# Patient Record
Sex: FEMALE | Race: WHITE | NOT HISPANIC OR LATINO | Employment: UNEMPLOYED | URBAN - METROPOLITAN AREA
[De-identification: names, ages, dates, MRNs, and addresses within clinical notes are randomized per-mention and may not be internally consistent; named-entity substitution may affect disease eponyms.]

---

## 2017-01-13 RX ORDER — IBUPROFEN 800 MG/1
TABLET ORAL
Qty: 90 TABLET | Refills: 0 | OUTPATIENT
Start: 2017-01-13

## 2017-01-19 RX ORDER — IBUPROFEN 800 MG/1
TABLET ORAL
Qty: 90 TABLET | Refills: 0 | Status: SHIPPED | OUTPATIENT
Start: 2017-01-19 | End: 2017-02-22 | Stop reason: SDUPTHER

## 2017-02-22 RX ORDER — IBUPROFEN 800 MG/1
TABLET ORAL
Qty: 90 TABLET | Refills: 0 | Status: SHIPPED | OUTPATIENT
Start: 2017-02-22 | End: 2017-10-03 | Stop reason: SDUPTHER

## 2017-03-20 RX ORDER — IBUPROFEN 800 MG/1
TABLET ORAL
Qty: 90 TABLET | Refills: 0 | Status: SHIPPED | OUTPATIENT
Start: 2017-03-20 | End: 2017-06-22 | Stop reason: SDUPTHER

## 2017-05-03 RX ORDER — LEVOTHYROXINE SODIUM 0.15 MG/1
TABLET ORAL
Qty: 30 TABLET | Refills: 2 | Status: SHIPPED | OUTPATIENT
Start: 2017-05-03 | End: 2017-08-29 | Stop reason: SDUPTHER

## 2017-06-22 DIAGNOSIS — I10 ESSENTIAL HYPERTENSION: ICD-10-CM

## 2017-06-22 RX ORDER — LISINOPRIL AND HYDROCHLOROTHIAZIDE 20; 12.5 MG/1; MG/1
TABLET ORAL
Qty: 90 TABLET | Refills: 0 | Status: SHIPPED | OUTPATIENT
Start: 2017-06-22 | End: 2017-08-29 | Stop reason: SDUPTHER

## 2017-06-22 RX ORDER — GABAPENTIN 400 MG/1
CAPSULE ORAL
Qty: 90 CAPSULE | Refills: 0 | Status: SHIPPED | OUTPATIENT
Start: 2017-06-22 | End: 2017-10-03 | Stop reason: SDUPTHER

## 2017-06-22 RX ORDER — IBUPROFEN 800 MG/1
TABLET ORAL
Qty: 90 TABLET | Refills: 0 | Status: SHIPPED | OUTPATIENT
Start: 2017-06-22 | End: 2017-09-28 | Stop reason: DRUGHIGH

## 2017-08-29 DIAGNOSIS — I10 ESSENTIAL HYPERTENSION: ICD-10-CM

## 2017-08-31 RX ORDER — LISINOPRIL AND HYDROCHLOROTHIAZIDE 20; 12.5 MG/1; MG/1
1 TABLET ORAL DAILY
Qty: 30 TABLET | Refills: 0 | Status: SHIPPED | OUTPATIENT
Start: 2017-08-31 | End: 2017-10-03 | Stop reason: SDUPTHER

## 2017-08-31 RX ORDER — LEVOTHYROXINE SODIUM 0.15 MG/1
150 TABLET ORAL DAILY
Qty: 30 TABLET | Refills: 2 | Status: SHIPPED | OUTPATIENT
Start: 2017-08-31 | End: 2017-09-29 | Stop reason: DRUGHIGH

## 2017-09-28 ENCOUNTER — OFFICE VISIT (OUTPATIENT)
Dept: INTERNAL MEDICINE | Facility: CLINIC | Age: 42
End: 2017-09-28

## 2017-09-28 VITALS
DIASTOLIC BLOOD PRESSURE: 80 MMHG | RESPIRATION RATE: 18 BRPM | WEIGHT: 263 LBS | HEART RATE: 59 BPM | HEIGHT: 63 IN | SYSTOLIC BLOOD PRESSURE: 138 MMHG | BODY MASS INDEX: 46.6 KG/M2 | OXYGEN SATURATION: 100 %

## 2017-09-28 DIAGNOSIS — E55.9 VITAMIN D DEFICIENCY: ICD-10-CM

## 2017-09-28 DIAGNOSIS — I10 ESSENTIAL HYPERTENSION: Primary | ICD-10-CM

## 2017-09-28 DIAGNOSIS — E03.9 HYPOTHYROIDISM, UNSPECIFIED TYPE: ICD-10-CM

## 2017-09-28 DIAGNOSIS — Z13.220 SCREENING FOR CHOLESTEROL LEVEL: ICD-10-CM

## 2017-09-28 LAB
25(OH)D3 SERPL-MCNC: 25.5 NG/ML
ALBUMIN SERPL-MCNC: 4.4 G/DL (ref 3.2–4.8)
ALBUMIN/GLOB SERPL: 2 G/DL (ref 1.5–2.5)
ALP SERPL-CCNC: 77 U/L (ref 25–100)
ALT SERPL W P-5'-P-CCNC: 19 U/L (ref 7–40)
ANION GAP SERPL CALCULATED.3IONS-SCNC: 4 MMOL/L (ref 3–11)
ARTICHOKE IGE QN: 97 MG/DL (ref 0–130)
AST SERPL-CCNC: 21 U/L (ref 0–33)
BILIRUB SERPL-MCNC: 0.4 MG/DL (ref 0.3–1.2)
BUN BLD-MCNC: 10 MG/DL (ref 9–23)
BUN/CREAT SERPL: 16.7 (ref 7–25)
CALCIUM SPEC-SCNC: 8.9 MG/DL (ref 8.7–10.4)
CHLORIDE SERPL-SCNC: 110 MMOL/L (ref 99–109)
CHOLEST SERPL-MCNC: 163 MG/DL (ref 0–200)
CO2 SERPL-SCNC: 24 MMOL/L (ref 20–31)
CREAT BLD-MCNC: 0.6 MG/DL (ref 0.6–1.3)
GFR SERPL CREATININE-BSD FRML MDRD: 111 ML/MIN/1.73
GLOBULIN UR ELPH-MCNC: 2.2 GM/DL
GLUCOSE BLD-MCNC: 76 MG/DL (ref 70–100)
HDLC SERPL-MCNC: 35 MG/DL (ref 40–60)
POTASSIUM BLD-SCNC: 4.2 MMOL/L (ref 3.5–5.5)
PROT SERPL-MCNC: 6.6 G/DL (ref 5.7–8.2)
SODIUM BLD-SCNC: 138 MMOL/L (ref 132–146)
TRIGL SERPL-MCNC: 294 MG/DL (ref 0–150)
TSH SERPL DL<=0.05 MIU/L-ACNC: 14.9 MIU/ML (ref 0.35–5.35)

## 2017-09-28 PROCEDURE — 80053 COMPREHEN METABOLIC PANEL: CPT | Performed by: NURSE PRACTITIONER

## 2017-09-28 PROCEDURE — 90471 IMMUNIZATION ADMIN: CPT | Performed by: NURSE PRACTITIONER

## 2017-09-28 PROCEDURE — 80061 LIPID PANEL: CPT | Performed by: NURSE PRACTITIONER

## 2017-09-28 PROCEDURE — 84443 ASSAY THYROID STIM HORMONE: CPT | Performed by: NURSE PRACTITIONER

## 2017-09-28 PROCEDURE — 82306 VITAMIN D 25 HYDROXY: CPT | Performed by: NURSE PRACTITIONER

## 2017-09-28 PROCEDURE — 90686 IIV4 VACC NO PRSV 0.5 ML IM: CPT | Performed by: NURSE PRACTITIONER

## 2017-09-28 PROCEDURE — 99214 OFFICE O/P EST MOD 30 MIN: CPT | Performed by: NURSE PRACTITIONER

## 2017-09-28 RX ORDER — MELATONIN
1000 DAILY
COMMUNITY

## 2017-09-28 NOTE — PROGRESS NOTES
"Subjective  Hypertension (follow up)      Tawana Christianson is a 41 y.o. female.   Allergies   Allergen Reactions   • Diclofenac    • Voltaren [Diclofenac Sodium]      History of Present Illness      Doing well, chronic conditions are stable , no complaints, taking meds as ordered   The following portions of the patient's history were reviewed and updated as appropriate: current medications, past family history, past surgical history and problem list.    Review of Systems   Constitutional: Negative for fatigue.   Respiratory: Negative for apnea, chest tightness and shortness of breath.    Cardiovascular: Negative for chest pain, palpitations and leg swelling.   Skin: Negative for color change.   Psychiatric/Behavioral: The patient is not nervous/anxious.    All other systems reviewed and are negative.      Objective   Physical Exam   Constitutional: She is oriented to person, place, and time. She appears well-developed and well-nourished.   HENT:   Head: Normocephalic.   Eyes: Conjunctivae are normal.   Neck: Neck supple. No thyromegaly present.   Cardiovascular: Normal rate, regular rhythm and normal heart sounds.    Pulmonary/Chest: Effort normal and breath sounds normal.   Lymphadenopathy:     She has no cervical adenopathy.   Neurological: She is alert and oriented to person, place, and time.   Skin: Skin is warm and dry.   Psychiatric: She has a normal mood and affect. Her behavior is normal. Judgment and thought content normal.   Nursing note and vitals reviewed.    /80  Pulse 59  Resp 18  Ht 63\" (160 cm)  Wt 263 lb (119 kg)  SpO2 100%  BMI 46.59 kg/m2    Assessment/Plan     Problem List Items Addressed This Visit        Cardiovascular and Mediastinum    Essential hypertension - Primary    Relevant Orders    Comprehensive Metabolic Panel       Digestive    Vitamin D deficiency    Relevant Orders    Vitamin D 25 Hydroxy       Endocrine    Hypothyroidism    Relevant Orders    TSH      Other Visit " Diagnoses     Screening for cholesterol level        Relevant Orders    Lipid Panel        Flu shot today, rtc 1 yr cpe

## 2017-09-29 RX ORDER — LEVOTHYROXINE SODIUM 0.2 MG/1
200 TABLET ORAL DAILY
Qty: 30 TABLET | Refills: 4 | Status: SHIPPED | OUTPATIENT
Start: 2017-09-29 | End: 2017-10-03 | Stop reason: SDUPTHER

## 2017-10-03 DIAGNOSIS — I10 ESSENTIAL HYPERTENSION: ICD-10-CM

## 2017-10-03 RX ORDER — LEVOTHYROXINE SODIUM 0.2 MG/1
200 TABLET ORAL DAILY
Qty: 30 TABLET | Refills: 4 | Status: SHIPPED | OUTPATIENT
Start: 2017-10-03 | End: 2021-03-02

## 2017-10-03 RX ORDER — IBUPROFEN 800 MG/1
800 TABLET ORAL EVERY 8 HOURS PRN
Qty: 90 TABLET | Refills: 0 | Status: SHIPPED | OUTPATIENT
Start: 2017-10-03 | End: 2018-02-02 | Stop reason: SDUPTHER

## 2017-10-03 RX ORDER — LISINOPRIL AND HYDROCHLOROTHIAZIDE 20; 12.5 MG/1; MG/1
1 TABLET ORAL DAILY
Qty: 30 TABLET | Refills: 0 | Status: SHIPPED | OUTPATIENT
Start: 2017-10-03 | End: 2018-02-26 | Stop reason: SDUPTHER

## 2017-10-03 RX ORDER — GABAPENTIN 400 MG/1
400 CAPSULE ORAL 3 TIMES DAILY
Qty: 90 CAPSULE | Refills: 5 | Status: SHIPPED | OUTPATIENT
Start: 2017-10-03 | End: 2018-06-04 | Stop reason: SDUPTHER

## 2017-10-04 ENCOUNTER — TELEPHONE (OUTPATIENT)
Dept: INTERNAL MEDICINE | Facility: CLINIC | Age: 42
End: 2017-10-04

## 2017-10-04 NOTE — TELEPHONE ENCOUNTER
Spoke with pt and explained that her Rx for gabapentin is ready for  at the front window.   Pt was unaware gabapentin is now a controlled subtance and asked if it could not be submitted electronically anymore. I explained that now she'd have to  due to it being switched to a controlled substance. Pt stated she understood and was thankful for the call.

## 2017-12-01 DIAGNOSIS — I10 ESSENTIAL HYPERTENSION: ICD-10-CM

## 2017-12-01 RX ORDER — LISINOPRIL AND HYDROCHLOROTHIAZIDE 20; 12.5 MG/1; MG/1
TABLET ORAL
Qty: 30 TABLET | Refills: 2 | Status: SHIPPED | OUTPATIENT
Start: 2017-12-01 | End: 2018-02-26 | Stop reason: SDUPTHER

## 2018-02-03 RX ORDER — IBUPROFEN 800 MG/1
TABLET ORAL
Qty: 90 TABLET | Refills: 0 | Status: SHIPPED | OUTPATIENT
Start: 2018-02-03 | End: 2018-03-23 | Stop reason: SDUPTHER

## 2018-02-26 ENCOUNTER — OFFICE VISIT (OUTPATIENT)
Dept: INTERNAL MEDICINE | Facility: CLINIC | Age: 43
End: 2018-02-26

## 2018-02-26 VITALS
HEIGHT: 63 IN | OXYGEN SATURATION: 99 % | DIASTOLIC BLOOD PRESSURE: 82 MMHG | WEIGHT: 255.2 LBS | BODY MASS INDEX: 45.22 KG/M2 | HEART RATE: 93 BPM | SYSTOLIC BLOOD PRESSURE: 130 MMHG

## 2018-02-26 DIAGNOSIS — I10 ESSENTIAL HYPERTENSION: Primary | ICD-10-CM

## 2018-02-26 DIAGNOSIS — M79.18 MUSCULOSKELETAL PAIN: ICD-10-CM

## 2018-02-26 DIAGNOSIS — E03.9 HYPOTHYROIDISM, UNSPECIFIED TYPE: ICD-10-CM

## 2018-02-26 LAB — TSH SERPL DL<=0.05 MIU/L-ACNC: 0.49 MIU/ML (ref 0.35–5.35)

## 2018-02-26 PROCEDURE — 99214 OFFICE O/P EST MOD 30 MIN: CPT | Performed by: NURSE PRACTITIONER

## 2018-02-26 PROCEDURE — 84443 ASSAY THYROID STIM HORMONE: CPT | Performed by: NURSE PRACTITIONER

## 2018-02-26 RX ORDER — LISINOPRIL AND HYDROCHLOROTHIAZIDE 20; 12.5 MG/1; MG/1
1 TABLET ORAL DAILY
Qty: 30 TABLET | Refills: 3 | Status: SHIPPED | OUTPATIENT
Start: 2018-02-26

## 2018-02-26 RX ORDER — CYCLOBENZAPRINE HCL 10 MG
10 TABLET ORAL 3 TIMES DAILY PRN
Qty: 30 TABLET | Refills: 0 | OUTPATIENT
Start: 2018-02-26 | End: 2019-12-15 | Stop reason: HOSPADM

## 2018-02-26 RX ORDER — PREDNISONE 20 MG/1
TABLET ORAL
Qty: 19 TABLET | Refills: 0 | Status: SHIPPED | OUTPATIENT
Start: 2018-02-26 | End: 2019-02-10

## 2018-02-26 NOTE — PROGRESS NOTES
"Subjective  Back Pain (mid back. on set 10 days ago.  no injuries. )      Tawana Christianson is a 42 y.o. female.   Allergies   Allergen Reactions   • Diclofenac    • Voltaren [Diclofenac Sodium]      History of Present Illness      Mid back pain x 10 days, woke up with it , starts in center and shoots out , constant ache, does get episodes of sharp stabbing but an ache all the time , nothing makes worse or better, has tried ibuprofen 800mg no relief   No urinary sx  Taking b/p meds as ordered, no cp, no soa, no vision changes   The following portions of the patient's history were reviewed and updated as appropriate: allergies, past family history, past surgical history and problem list.    Review of Systems   Musculoskeletal: Positive for back pain.   All other systems reviewed and are negative.      Objective   Physical Exam   Constitutional: She appears well-developed and well-nourished.   HENT:   Head: Normocephalic and atraumatic.   Eyes: Conjunctivae are normal.   Neck: Neck supple.   Cardiovascular: Normal rate and regular rhythm.    Pulmonary/Chest: Effort normal and breath sounds normal.   Lymphadenopathy:     She has no cervical adenopathy.   Skin: Skin is warm and dry.   Psychiatric: She has a normal mood and affect. Her behavior is normal. Judgment and thought content normal.   Nursing note and vitals reviewed.    /82  Pulse 93  Ht 160 cm (62.99\")  Wt 116 kg (255 lb 3.2 oz)  SpO2 99%  BMI 45.22 kg/m2    Assessment/Plan     Problem List Items Addressed This Visit        Cardiovascular and Mediastinum    Essential hypertension - Primary    Relevant Medications    lisinopril-hydrochlorothiazide (PRINZIDE,ZESTORETIC) 20-12.5 MG per tablet       Endocrine    Hypothyroidism    Relevant Medications    predniSONE (DELTASONE) 20 MG tablet    Other Relevant Orders    TSH      Other Visit Diagnoses     Musculoskeletal pain        Relevant Medications    predniSONE (DELTASONE) 20 MG tablet    " cyclobenzaprine (FLEXERIL) 10 MG tablet        I will review labs and call with resulrs and poc, if no relief will rtc

## 2018-03-23 RX ORDER — IBUPROFEN 800 MG/1
TABLET ORAL
Qty: 90 TABLET | Refills: 0 | Status: SHIPPED | OUTPATIENT
Start: 2018-03-23 | End: 2018-06-05 | Stop reason: SDUPTHER

## 2018-06-02 NOTE — TELEPHONE ENCOUNTER
Please advise on Gabapentin,  Last OV: 2/26/2018  Last fill: 10/03/2017 Q: 90 TID w/ 5 refills  Jeremias: Needs updated

## 2018-06-04 RX ORDER — GABAPENTIN 400 MG/1
CAPSULE ORAL
Qty: 90 CAPSULE | Refills: 5 | Status: CANCELLED | OUTPATIENT
Start: 2018-06-04

## 2018-06-04 RX ORDER — GABAPENTIN 400 MG/1
400 CAPSULE ORAL 3 TIMES DAILY
Qty: 90 CAPSULE | Refills: 0 | Status: SHIPPED | OUTPATIENT
Start: 2018-06-04 | End: 2019-12-17 | Stop reason: SDUPTHER

## 2018-06-05 RX ORDER — IBUPROFEN 800 MG/1
TABLET ORAL
Qty: 90 TABLET | Refills: 0 | Status: SHIPPED | OUTPATIENT
Start: 2018-06-05

## 2018-08-23 RX ORDER — IBUPROFEN 800 MG/1
TABLET ORAL
Qty: 90 TABLET | Refills: 0 | OUTPATIENT
Start: 2018-08-23

## 2019-02-10 ENCOUNTER — OFFICE VISIT (OUTPATIENT)
Dept: RETAIL CLINIC | Facility: CLINIC | Age: 44
End: 2019-02-10

## 2019-02-10 VITALS
TEMPERATURE: 98.2 F | BODY MASS INDEX: 48.76 KG/M2 | RESPIRATION RATE: 18 BRPM | SYSTOLIC BLOOD PRESSURE: 128 MMHG | WEIGHT: 265 LBS | DIASTOLIC BLOOD PRESSURE: 84 MMHG | HEIGHT: 62 IN | HEART RATE: 70 BPM | OXYGEN SATURATION: 98 %

## 2019-02-10 DIAGNOSIS — H10.31 ACUTE CONJUNCTIVITIS OF RIGHT EYE, UNSPECIFIED ACUTE CONJUNCTIVITIS TYPE: Primary | ICD-10-CM

## 2019-02-10 PROCEDURE — 99213 OFFICE O/P EST LOW 20 MIN: CPT | Performed by: NURSE PRACTITIONER

## 2019-02-10 RX ORDER — TOBRAMYCIN 3 MG/ML
1 SOLUTION/ DROPS OPHTHALMIC EVERY 6 HOURS SCHEDULED
Qty: 5 ML | Refills: 0 | OUTPATIENT
Start: 2019-02-10 | End: 2019-12-15 | Stop reason: HOSPADM

## 2019-02-10 NOTE — PATIENT INSTRUCTIONS
Allergic Conjunctivitis, Adult  Allergic conjunctivitis is inflammation of the clear membrane that covers the white part of your eye and the inner surface of your eyelid (conjunctiva). The inflammation is caused by allergies. The blood vessels in the conjunctiva become inflamed and this causes the eyes to become red or pink. The eyes often feel itchy. Allergic conjunctivitis cannot be spread from one person to another person (is not contagious).  What are the causes?  This condition is caused by an allergic reaction. Common causes of an allergic reaction (allergens) include:  · Outdoor allergens, such as:  ? Pollen.  ? Grass and weeds.  ? Mold spores.  · Indoor allergens, such as:  ? Dust.  ? Smoke.  ? Mold.  ? Pet dander.  ? Animal hair.    What increases the risk?  You may be more likely to develop this condition if you have a family history of allergies, such as:  · Allergic rhinitis.  · Bronchial asthma.  · Atopic dermatitis.    What are the signs or symptoms?  Symptoms of this condition include eyes that are:  · Itchy.  · Red.  · Watery.  · Puffy.    Your eyes may also:  · Sting or burn.  · Have clear drainage coming from them.    How is this diagnosed?  This condition may be diagnosed by medical history and physical exam. If you have drainage from your eyes, it may be tested to rule out other causes of conjunctivitis. You may also need to see a health care provider who specializes in treating allergies (allergist) or eye conditions (ophthalmologist) for tests to confirm the diagnosis. You may have:  · Skin tests to see which allergens are causing your symptoms. These tests involve pricking the skin with a tiny needle and exposing the skin to small amounts of potential allergens to see if your skin reacts.  · Blood tests.  · Tissue scrapings from your eyelid. These will be examined under a microscope.    How is this treated?  Treatments for this condition may include:  · Cold cloths (compresses) to soothe  itching and swelling.  · Washing the face to remove allergens.  · Eye drops. These may be prescription or over-the-counter. There are several different types. You may need to try different types to see which one works best for you. Your may need:  ? Eye drops that block the allergic reaction (antihistamine).  ? Eye drops that reduce swelling and irritation (anti-inflammatory).  ? Steroid eye drops to lessen a severe reaction (vernal conjunctivitis).  · Oral antihistamine medicines to reduce your allergic reaction. You may need these if eye drops do not help or are difficult to use.    Follow these instructions at home:  · Avoid known allergens whenever possible.  · Take or apply over-the-counter and prescription medicines only as told by your health care provider. These include any eye drops.  · Apply a cool, clean washcloth to your eye for 10-20 minutes, 3-4 times a day.  · Do not touch or rub your eyes.  · Do not wear contact lenses until the inflammation is gone. Wear glasses instead.  · Do not wear eye makeup until the inflammation is gone.  · Keep all follow-up visits as told by your health care provider. This is important.  Contact a health care provider if:  · Your symptoms get worse or do not improve with treatment.  · You have mild eye pain.  · You have sensitivity to light.  · You have spots or blisters on your eyes.  · You have pus draining from your eye.  · You have a fever.  Get help right away if:  · You have redness, swelling, or other symptoms in only one eye.  · Your vision is blurred or you have vision changes.  · You have severe eye pain.  This information is not intended to replace advice given to you by your health care provider. Make sure you discuss any questions you have with your health care provider.  Document Released: 03/09/2004 Document Revised: 08/16/2017 Document Reviewed: 06/30/2017  [employer] Interactive Patient Education © 2018 [employer]    Follow up with eye care specialist. See  ASAP if excessive tearing, sensitivity to light, eye pain, decreased vision, headache or other symptoms that prompt urgent evaluation.   Avoid touching or rubbing eye.  Damp compress to eye may be soothing

## 2019-02-10 NOTE — PROGRESS NOTES
Subjective   Conjunctivitis    Tawana Christianson is a 43 y.o. female. Patient with onset of itching sensation right eye yesterday. She noticed that she was rubbing at it often and it became red and had some swelling. She used OTC lubricating drops and local ice application with minimal improvement. She denies exposure to pink eye.     Conjunctivitis    The current episode started yesterday. The onset is undetermined. The problem occurs continuously. The problem has been unchanged. The problem is moderate. Nothing relieves the symptoms. Nothing aggravates the symptoms. Associated symptoms include eye itching and eye redness. Pertinent negatives include no fever, no decreased vision, no double vision, no photophobia, no nausea, no vomiting, no congestion, no ear pain, no headaches, no mouth sores, no rhinorrhea, no sore throat, no neck pain, no cough, no URI, no wheezing, no rash, no eye discharge and no eye pain. The right eye is affected. The eyelid exhibits swelling.        History Obtained from: Patient    Past Medical History:   Diagnosis Date   • Chills    • Essential hypertension 2016   • Foot pain    • History of abnormal cervical Pap smear     normal not abnormal -    • History of Papanicolaou smear of cervix    • Hypertension    • Numbness    • Pain    • Paresthesia    • Plantar fasciitis    • Pulmonary nodule    • Right ankle pain    • Tingling    • Vertigo    • Viral infection    • Viral infection    • Vitamin D deficiency      Past Surgical History:   Procedure Laterality Date   •  SECTION      x3   • TONSILLECTOMY       Social History     Tobacco Use   • Smoking status: Former Smoker     Packs/day: 2.00     Years: 20.00     Pack years: 40.00     Last attempt to quit: 2010     Years since quittin.1   • Smokeless tobacco: Never Used   • Tobacco comment: 1.5-2 ppd for 20 yrs   Substance Use Topics   • Alcohol use: No   • Drug use: No     Family History   Problem Relation Age of Onset    • Hypertension Mother    • Arthritis Mother    • Heart disease Father    • Diabetes Father    • Hypertension Father    • Stroke Father    • Heart attack Father    • Arthritis Father    • Obesity Father    • Alzheimer's disease Maternal Grandmother    • Cancer Maternal Grandmother    • Cancer Maternal Grandfather    • Alzheimer's disease Maternal Grandfather      Allergies   Allergen Reactions   • Diclofenac Swelling   • Voltaren [Diclofenac Sodium] Swelling     Current Outpatient Medications   Medication Sig Dispense Refill   • cetirizine (ZYRTEC ALLERGY) 10 MG tablet Take  by mouth.     • cholecalciferol (VITAMIN D3) 1000 units tablet Take 1,000 Units by mouth Daily.     • cyclobenzaprine (FLEXERIL) 10 MG tablet Take 1 tablet by mouth 3 (Three) Times a Day As Needed for Muscle Spasms. 30 tablet 0   • gabapentin (NEURONTIN) 400 MG capsule Take 1 capsule by mouth 3 (Three) Times a Day. Needs appt before further refills 90 capsule 0   • ibuprofen (ADVIL,MOTRIN) 800 MG tablet TAKE 1 TABLET BY MOUTH EVERY 8 HOURS AS NEEDED FOR MILD PAIN 90 tablet 0   • levothyroxine (SYNTHROID) 200 MCG tablet Take 1 tablet by mouth Daily. 30 tablet 4   • lisinopril-hydrochlorothiazide (PRINZIDE,ZESTORETIC) 20-12.5 MG per tablet Take 1 tablet by mouth Daily. 30 tablet 3   • tobramycin (TOBREX) 0.3 % solution ophthalmic solution Administer 1 drop to the right eye Every 6 (Six) Hours. 5 mL 0     No current facility-administered medications for this visit.         The following portions of the patient's history were reviewed and updated as appropriate: allergies, current medications, past family history, past medical history, past social history and past surgical history.    Review of Systems   Constitutional: Negative for chills and fever.   HENT: Negative for congestion, ear pain, mouth sores, rhinorrhea and sore throat.    Eyes: Positive for redness and itching. Negative for double vision, photophobia, pain, discharge and visual  "disturbance.   Respiratory: Negative for cough and wheezing.    Gastrointestinal: Negative for nausea and vomiting.   Musculoskeletal: Negative for myalgias, neck pain and neck stiffness.   Skin: Negative for rash and wound.   Allergic/Immunologic: Negative for immunocompromised state.   Neurological: Negative for dizziness and headaches.   Hematological: Negative for adenopathy.   Psychiatric/Behavioral: Negative.        Objective     VITAL SIGNS:   Vitals:    02/10/19 1132   BP: 128/84   BP Location: Left arm   Pulse: 70   Resp: 18   Temp: 98.2 °F (36.8 °C)   SpO2: 98%   Weight: 120 kg (265 lb)   Height: 157.5 cm (62\")   Body mass index is 48.47 kg/m².    Physical Exam   Constitutional:  Non-toxic appearance. No distress.   HENT:   Head: Atraumatic.   Right Ear: External ear normal.   Left Ear: External ear normal.   Nose: Nose normal.   Mouth/Throat: Uvula is midline and mucous membranes are normal.   Eyes: Right eye exhibits no discharge, no exudate and no hordeolum. No foreign body present in the right eye. Right conjunctiva is injected. Right conjunctiva has no hemorrhage. No scleral icterus. Right eye exhibits normal extraocular motion and no nystagmus. Right pupil is round and reactive. Pupils are equal.   Neck: Normal range of motion and phonation normal. Neck supple. No tracheal deviation present.   Cardiovascular: Normal rate and regular rhythm.   Pulmonary/Chest: Effort normal.   Neurological: She is alert.   Skin: Skin is warm and dry.   Psychiatric: She is attentive.           Assessment/Plan     Tawana was seen today for conjunctivitis.    Diagnoses and all orders for this visit:    Acute conjunctivitis of right eye, unspecified acute conjunctivitis type    Other orders  -     tobramycin (TOBREX) 0.3 % solution ophthalmic solution; Administer 1 drop to the right eye Every 6 (Six) Hours.        PLAN: Will trial antibiotic drops and re-eval for response. Patient to communicate effectiveness of " prescribed treatment and follow up with eye care specialist. See ASAP if symptoms worsen or for the development of new symptoms that need attention.    The patient voiced understanding and agreement to the patient treatment plan and instructions     LEVAR Castillo

## 2019-05-21 ENCOUNTER — LAB (OUTPATIENT)
Dept: LAB | Facility: HOSPITAL | Age: 44
End: 2019-05-21

## 2019-05-21 ENCOUNTER — OFFICE VISIT (OUTPATIENT)
Dept: NEUROLOGY | Facility: CLINIC | Age: 44
End: 2019-05-21

## 2019-05-21 VITALS
BODY MASS INDEX: 48.76 KG/M2 | HEIGHT: 62 IN | WEIGHT: 265 LBS | DIASTOLIC BLOOD PRESSURE: 76 MMHG | SYSTOLIC BLOOD PRESSURE: 132 MMHG

## 2019-05-21 DIAGNOSIS — G62.9 NEUROPATHY: ICD-10-CM

## 2019-05-21 DIAGNOSIS — M54.41 CHRONIC BILATERAL LOW BACK PAIN WITH BILATERAL SCIATICA: ICD-10-CM

## 2019-05-21 DIAGNOSIS — G89.29 CHRONIC BILATERAL LOW BACK PAIN WITH BILATERAL SCIATICA: ICD-10-CM

## 2019-05-21 DIAGNOSIS — G62.9 NEUROPATHY: Primary | ICD-10-CM

## 2019-05-21 DIAGNOSIS — M54.42 CHRONIC BILATERAL LOW BACK PAIN WITH BILATERAL SCIATICA: ICD-10-CM

## 2019-05-21 LAB
CRP SERPL-MCNC: 1.08 MG/DL (ref 0–0.5)
ERYTHROCYTE [SEDIMENTATION RATE] IN BLOOD: 17 MM/HR (ref 0–20)
FOLATE SERPL-MCNC: >20 NG/ML (ref 4.78–24.2)
VIT B12 BLD-MCNC: 460 PG/ML (ref 211–946)

## 2019-05-21 PROCEDURE — 82784 ASSAY IGA/IGD/IGG/IGM EACH: CPT

## 2019-05-21 PROCEDURE — 84155 ASSAY OF PROTEIN SERUM: CPT

## 2019-05-21 PROCEDURE — 99204 OFFICE O/P NEW MOD 45 MIN: CPT | Performed by: PSYCHIATRY & NEUROLOGY

## 2019-05-21 PROCEDURE — 83516 IMMUNOASSAY NONANTIBODY: CPT

## 2019-05-21 PROCEDURE — 86255 FLUORESCENT ANTIBODY SCREEN: CPT

## 2019-05-21 PROCEDURE — 86225 DNA ANTIBODY NATIVE: CPT

## 2019-05-21 PROCEDURE — 36415 COLL VENOUS BLD VENIPUNCTURE: CPT

## 2019-05-21 PROCEDURE — 86235 NUCLEAR ANTIGEN ANTIBODY: CPT

## 2019-05-21 PROCEDURE — 86140 C-REACTIVE PROTEIN: CPT

## 2019-05-21 PROCEDURE — 82607 VITAMIN B-12: CPT

## 2019-05-21 PROCEDURE — 85652 RBC SED RATE AUTOMATED: CPT

## 2019-05-21 PROCEDURE — 84165 PROTEIN E-PHORESIS SERUM: CPT

## 2019-05-21 PROCEDURE — 86038 ANTINUCLEAR ANTIBODIES: CPT

## 2019-05-21 PROCEDURE — 82595 ASSAY OF CRYOGLOBULIN: CPT

## 2019-05-21 PROCEDURE — 82746 ASSAY OF FOLIC ACID SERUM: CPT

## 2019-05-21 PROCEDURE — 86592 SYPHILIS TEST NON-TREP QUAL: CPT

## 2019-05-21 PROCEDURE — 86618 LYME DISEASE ANTIBODY: CPT

## 2019-05-21 RX ORDER — OMEPRAZOLE 20 MG/1
20 CAPSULE, DELAYED RELEASE ORAL DAILY
COMMUNITY

## 2019-05-21 RX ORDER — GABAPENTIN 100 MG/1
200 CAPSULE ORAL 3 TIMES DAILY
Qty: 180 CAPSULE | Refills: 3 | Status: SHIPPED | OUTPATIENT
Start: 2019-05-21 | End: 2019-06-20

## 2019-05-21 RX ORDER — OLMESARTAN MEDOXOMIL AND HYDROCHLOROTHIAZIDE 20/12.5 20; 12.5 MG/1; MG/1
TABLET ORAL
COMMUNITY
Start: 2019-05-16 | End: 2019-12-15 | Stop reason: HOSPADM

## 2019-05-21 NOTE — PROGRESS NOTES
Subjective:    CC: Tawana Christianson is seen today in consultation at the request of July Lott MD for idiopathic peripheral neuropathy       HPI:  Patient is a 43-year-old female referred to the clinic for evaluation of possible neuropathy and chronic low back pain.  She reports that she started having symptoms of neuropathy back in 2012 and since then, progressively it has become worse.  She reports tingling, numbness, burning and pain involving both her feet.  She also reports numbness involving digits 3, 4 and 5 in both her hands.  She reports that it has affected her balance and also gait.  In addition to this, she also reports several years history of chronic low back pain with sometimes radiates to involve back of her thighs extending up to the knees bilaterally.  She has had x-ray done a few years ago which showed mild spondylitic changes.  MRI has never been performed.  She has been on gabapentin for a while and currently takes 400 mg 3 times a day which helps with neuropathy symptoms somewhat.  He does not help with low back pain.  She is currently involved in physical therapy and it reports it helps temporarily with chronic low back pain.  She denies any problems with bowel or bladder control.  She does report sometimes chronic low back pain can radiate to involve hips bilaterally-worse on the right than on the left.    The following portions of the patient's history were reviewed today and updated as of 05/21/2019  : allergies, social history and problem list.  This document will be scanned to patient's chart.      Current Outpatient Medications:   •  cetirizine (ZYRTEC ALLERGY) 10 MG tablet, Take  by mouth., Disp: , Rfl:   •  cholecalciferol (VITAMIN D3) 1000 units tablet, Take 1,000 Units by mouth Daily., Disp: , Rfl:   •  cyclobenzaprine (FLEXERIL) 10 MG tablet, Take 1 tablet by mouth 3 (Three) Times a Day As Needed for Muscle Spasms., Disp: 30 tablet, Rfl: 0  •  gabapentin (NEURONTIN) 400 MG  capsule, Take 1 capsule by mouth 3 (Three) Times a Day. Needs appt before further refills, Disp: 90 capsule, Rfl: 0  •  ibuprofen (ADVIL,MOTRIN) 800 MG tablet, TAKE 1 TABLET BY MOUTH EVERY 8 HOURS AS NEEDED FOR MILD PAIN, Disp: 90 tablet, Rfl: 0  •  levothyroxine (SYNTHROID) 200 MCG tablet, Take 1 tablet by mouth Daily., Disp: 30 tablet, Rfl: 4  •  lisinopril-hydrochlorothiazide (PRINZIDE,ZESTORETIC) 20-12.5 MG per tablet, Take 1 tablet by mouth Daily., Disp: 30 tablet, Rfl: 3  •  olmesartan-hydrochlorothiazide (BENICAR HCT) 20-12.5 MG per tablet, , Disp: , Rfl:   •  omeprazole (priLOSEC) 20 MG capsule, Take 20 mg by mouth Daily., Disp: , Rfl:   •  tobramycin (TOBREX) 0.3 % solution ophthalmic solution, Administer 1 drop to the right eye Every 6 (Six) Hours., Disp: 5 mL, Rfl: 0  •  gabapentin (NEURONTIN) 100 MG capsule, Take 2 capsules by mouth 3 (Three) Times a Day for 30 days., Disp: 180 capsule, Rfl: 3   Past Medical History:   Diagnosis Date   • Chills    • Essential hypertension 2016   • Foot pain    • History of abnormal cervical Pap smear     normal not abnormal -    • History of Papanicolaou smear of cervix    • Hypertension    • Numbness    • Pain    • Paresthesia    • Plantar fasciitis    • Pulmonary nodule    • Right ankle pain    • Tingling    • Vertigo    • Viral infection    • Viral infection    • Vitamin D deficiency       Past Surgical History:   Procedure Laterality Date   •  SECTION      x3   • TONSILLECTOMY        Family History   Problem Relation Age of Onset   • Hypertension Mother    • Arthritis Mother    • Heart disease Father    • Diabetes Father    • Hypertension Father    • Stroke Father    • Heart attack Father    • Arthritis Father    • Obesity Father    • Alzheimer's disease Maternal Grandmother    • Cancer Maternal Grandmother    • Cancer Maternal Grandfather    • Alzheimer's disease Maternal Grandfather       Review of Systems   Constitutional: Positive for fatigue.  "  Cardiovascular: Positive for leg swelling.   Endocrine: Positive for cold intolerance.   Musculoskeletal: Positive for back pain and gait problem.   Neurological: Positive for tremors, weakness and numbness.   Psychiatric/Behavioral: The patient is nervous/anxious.        All other systems reviewed and are negative     Objective:    /76   Ht 157.5 cm (62\")   Wt 120 kg (265 lb)   BMI 48.47 kg/m²     Neurology Exam:    General apperance: NAD.     Mental status: Alert, awake and oriented to time place and person.    Recent and Remote memory: Can recall 3/3 objects at 5 minutes. Can recall historical events.     Attention span and Concentration: Serial 7s: Normal.     Fund of knowledge:  Normal.     Language and Speech: No aphasia or dysarthria.    Naming , Repitition and Comprehension:  Can name objects, repeat a sentence and follow commands. Speech is clear and fluent with good repetition, comprehension, and naming.    Cranial Nerves:   CN II: Visual fields are full. Intact. Fundi - Normal, No papillederma, Pupils - MAYRA  CN III, IV and VI: Extraocular movements are intact. Normal saccades.   CN V: Facial sensation is intact.   CN VII: Muscles of facial expression reveal no asymmetry. Intact.   CN VIII: Hearing is intact. Whispered voice intact.   CN IX and X: Palate elevates symmetrically. Intact  CN XI: Shoulder shrug is intact.   CN XII: Tongue is midline without evidence of atrophy or fasciculation.     Motor:  Right UE muscle strength 5/5. Normal tone.     Left UE muscle strength 5/5. Normal tone.      Right LE muscle strength5/5. Normal tone.     Left LE muscle strength 5/5. Normal tone.      Sensory: Reduced light touch, vibration and pinprick sensation bilateral hands and bilateral feet.    DTRs: 1+ bilaterally in upper extremity, 1+ bilateral knee and absent bilateral ankles.    Babinski: Negative bilaterally.    Co-ordination: Normal finger-to-nose, heel to shin B/L.    Rhomberg: " Negative.    Gait: Antalgic gait on the right.    Cardiovascular: Regular rate and rhythm without murmur, gallop or rub.    Ophthalmoscopic exam: Normal fundi, no papilledema.    Assessment and Plan:  1. Neuropathy  Likely neuropathy based on patient's symptoms.  Will order a detailed neuropathy work-up for further evaluation.  She was found to have low vitamin B12 in the past and was taking vitamin B12 thousand micrograms every day.  She reports that few months ago, when vitamin B12 was checked, it was more than 1000.  Following this, she stopped taking it.  I have advised her to restart vitamin B12.  Since symptoms are somewhat worse, I will also increase gabapentin to 600 mg 3 times a day.  - [place]; Future  - Anti-DNA Antibody, Double-stranded; Future  - Celiac Disease Panel; Future  - C-reactive Protein; Future  - Cryoglobulin; Future  - Lyme Disease IgG/IgM Antibodies; Future  - Protein Elec + Interp, Serum; Future  - RPR; Future  - Sedimentation Rate; Future  - Sjogren's Antibody, Anti-SS-A / -SS-B; Future  - Vitamin B12 & Folate; Future    2. Chronic bilateral low back pain with bilateral sciatica  Will  obtain MRI of lower back for further evaluation and based on the report, further treatment options will be discussed with the patient. Likely higher dose of gabapentin 600 mg 3 times a day will help with low back pain.  - MRI Lumbar Spine Without Contrast; Future       Return in about 8 weeks (around 7/16/2019).     Reinaldo Zelaya MD

## 2019-05-22 LAB
ALBUMIN SERPL-MCNC: 3.3 G/DL (ref 2.9–4.4)
ALBUMIN/GLOB SERPL: 1.1 {RATIO} (ref 0.7–1.7)
ALPHA1 GLOB FLD ELPH-MCNC: 0.2 G/DL (ref 0–0.4)
ALPHA2 GLOB SERPL ELPH-MCNC: 0.8 G/DL (ref 0.4–1)
ANA SER QL: NEGATIVE
B BURGDOR IGG SER QL: NEGATIVE
B BURGDOR IGM SER QL: NEGATIVE
B-GLOBULIN SERPL ELPH-MCNC: 1.3 G/DL (ref 0.7–1.3)
DSDNA AB SER-ACNC: <1 IU/ML (ref 0–9)
ENA SS-A AB SER-ACNC: <0.2 AI (ref 0–0.9)
ENA SS-B AB SER-ACNC: <0.2 AI (ref 0–0.9)
ENDOMYSIUM IGA SER QL: NEGATIVE
GAMMA GLOB SERPL ELPH-MCNC: 0.7 G/DL (ref 0.4–1.8)
GLOBULIN SER CALC-MCNC: 3 G/DL (ref 2.2–3.9)
IGA SERPL-MCNC: 101 MG/DL (ref 87–352)
Lab: NORMAL
M-SPIKE: NORMAL G/DL
PROT PATTERN SERPL ELPH-IMP: NORMAL
PROT SERPL-MCNC: 6.3 G/DL (ref 6–8.5)
RPR SER QL: NORMAL
TTG IGA SER-ACNC: <2 U/ML (ref 0–3)

## 2019-05-28 ENCOUNTER — DOCUMENTATION (OUTPATIENT)
Dept: NEUROLOGY | Facility: CLINIC | Age: 44
End: 2019-05-28

## 2019-05-28 LAB — CRYOGLOB SER QL 1D COLD INC: NORMAL

## 2019-05-28 NOTE — PROGRESS NOTES
Yoly staton [facility] left VM for us to contact [employer] at [phone] regarding the auth for MRI.     Contacted [employer] and they advised for me to fax clinical notes to [phone] and it has been completed.

## 2019-05-29 ENCOUNTER — TELEPHONE (OUTPATIENT)
Dept: NEUROLOGY | Facility: CLINIC | Age: 44
End: 2019-05-29

## 2019-05-29 NOTE — TELEPHONE ENCOUNTER
----- Message from Reinaldo Zelaya MD sent at 5/29/2019  9:11 AM EDT -----  Inform patient normal.  Detailed neuropathy work-up has been negative.

## 2019-05-29 NOTE — TELEPHONE ENCOUNTER
Informed pt of normal lab results a negative neuorpathy work-up. Patient still waiting for determination from insurance for MRI. Advised clinical notes were submitted yesterday. Pt acknowledged understanding.

## 2019-05-30 ENCOUNTER — TELEPHONE (OUTPATIENT)
Dept: NEUROLOGY | Facility: CLINIC | Age: 44
End: 2019-05-30

## 2019-06-06 ENCOUNTER — TELEPHONE (OUTPATIENT)
Dept: NEUROLOGY | Facility: CLINIC | Age: 44
End: 2019-06-06

## 2019-06-06 NOTE — TELEPHONE ENCOUNTER
Informed pt that insurance requested a peer to peer in order to get Mri approved. Advised  has been made aware of this and he should be contacting them.

## 2019-06-06 NOTE — TELEPHONE ENCOUNTER
----- Message from Rosenda Curtis sent at 6/6/2019  8:22 AM EDT -----  Contact: PT  MARIA ANTONIA:    PT SAYS SHE SPOKE TO THE INSURANCE AND THEY TOLD HER THEY STILL NEED MORE INFORMATION. SHE SAYS THAT SHE DOESN'T KNOW WHAT MORE THEY WANT BUT SHE IS REALLY WANTING TO GET THIS PROCEDURE DONE AND WANTS TO KNOW WHAT ELSE NEEDS TO BE DONE TO GET IT MOVING.      CALL BACK : [phone]

## 2019-06-10 ENCOUNTER — TELEPHONE (OUTPATIENT)
Dept: NEUROLOGY | Facility: CLINIC | Age: 44
End: 2019-06-10

## 2019-06-11 ENCOUNTER — TELEPHONE (OUTPATIENT)
Dept: NEUROLOGY | Facility: CLINIC | Age: 44
End: 2019-06-11

## 2019-06-11 NOTE — TELEPHONE ENCOUNTER
----- Message from Rosenda Curtis Rep sent at 6/11/2019  9:29 AM EDT -----  Contact: [facility] EM:    PT'S LUMBAR SPINE MRI WAS DENIED BUT [facility] CALLED AND ADVISED A PEER TO PEER BE SET UP WITH [employer] TO GET THAT APPROVED     [employer] - [phone]  CASE NUMBER : [id number]

## 2019-06-11 NOTE — TELEPHONE ENCOUNTER
I spoke with them and now it has been scheduled for peer to peer review on June 14 at 3 PM.  Can you remind me on June 14?      Thanks,

## 2019-12-05 ENCOUNTER — TELEPHONE (OUTPATIENT)
Dept: NEUROLOGY | Facility: CLINIC | Age: 44
End: 2019-12-05

## 2019-12-05 DIAGNOSIS — G89.29 CHRONIC BILATERAL LOW BACK PAIN WITH BILATERAL SCIATICA: Primary | ICD-10-CM

## 2019-12-05 DIAGNOSIS — M54.42 CHRONIC BILATERAL LOW BACK PAIN WITH BILATERAL SCIATICA: Primary | ICD-10-CM

## 2019-12-05 DIAGNOSIS — M54.41 CHRONIC BILATERAL LOW BACK PAIN WITH BILATERAL SCIATICA: Primary | ICD-10-CM

## 2019-12-05 NOTE — TELEPHONE ENCOUNTER
Called ad informed pt that  has put in new orders for her MRI. Advised pt once it has been approved by insurance, CS will contact her to schedule. Advised that once that is scheduled, she will need to contact our office to schedule an appointment. Pt acknowledged understanding.

## 2019-12-05 NOTE — TELEPHONE ENCOUNTER
----- Message from Elena Johnson sent at 12/5/2019  9:39 AM EST -----  Contact: Dr. Garcia Gilliam,    Pt called in regards to an MRI that you had ordered for her at an earlier time. Pt's insurance had lapsed and she was unable to have it done. Pt stated that she now has medicaid and wants to know if she needs a new order for the MRI or if she needs to come in and see you first?  Please call back and advise. Pt can be reached @ [phone].

## 2019-12-14 ENCOUNTER — HOSPITAL ENCOUNTER (EMERGENCY)
Facility: HOSPITAL | Age: 44
Discharge: HOME | End: 2019-12-15
Attending: EMERGENCY MEDICINE | Admitting: EMERGENCY MEDICINE

## 2019-12-14 ENCOUNTER — APPOINTMENT (OUTPATIENT)
Dept: MRI IMAGING | Facility: HOSPITAL | Age: 44
End: 2019-12-14

## 2019-12-14 ENCOUNTER — APPOINTMENT (OUTPATIENT)
Dept: GENERAL RADIOLOGY | Facility: HOSPITAL | Age: 44
End: 2019-12-14

## 2019-12-14 DIAGNOSIS — G89.29 CHRONIC LEFT-SIDED LOW BACK PAIN WITH LEFT-SIDED SCIATICA: Primary | ICD-10-CM

## 2019-12-14 DIAGNOSIS — M54.42 CHRONIC LEFT-SIDED LOW BACK PAIN WITH LEFT-SIDED SCIATICA: Primary | ICD-10-CM

## 2019-12-14 PROCEDURE — 99283 EMERGENCY DEPT VISIT LOW MDM: CPT

## 2019-12-14 PROCEDURE — 72170 X-RAY EXAM OF PELVIS: CPT

## 2019-12-14 PROCEDURE — 72148 MRI LUMBAR SPINE W/O DYE: CPT

## 2019-12-15 VITALS
RESPIRATION RATE: 19 BRPM | BODY MASS INDEX: 47.84 KG/M2 | TEMPERATURE: 98.3 F | WEIGHT: 270 LBS | DIASTOLIC BLOOD PRESSURE: 78 MMHG | OXYGEN SATURATION: 97 % | HEIGHT: 63 IN | HEART RATE: 78 BPM | SYSTOLIC BLOOD PRESSURE: 118 MMHG

## 2019-12-15 RX ORDER — METHYLPREDNISOLONE 4 MG/1
TABLET ORAL
Qty: 1 EACH | Refills: 0 | Status: SHIPPED | OUTPATIENT
Start: 2019-12-15 | End: 2021-01-04

## 2019-12-15 NOTE — DISCHARGE INSTRUCTIONS
Continue to take your gabapentin as prescribed, discussed with your neurologist about increasing her dosage as needed.

## 2019-12-15 NOTE — ED PROVIDER NOTES
"    [facility] EMERGENCY DEPARTMENT    eMERGENCY dEPARTMENT eNCOUnter      Pt Name: Tawana Christianson  MRN: [id number]  YOB: 1975  Date of evaluation: 12/14/2019  Provider: Adarsh Ortiz DO    CHIEF COMPLAINT       Chief Complaint   Patient presents with   • Hip Pain         HISTORY OF PRESENT ILLNESS  (Location/Symptom, Timing/Onset, Context/Setting, Quality, Duration, Modifying Factors, Severity.)   Tawana Christianson is a 44 y.o. female who presents to the emergency department with complaints of left hip pain that started \"years\" ago. The patient reports that her pain radiates down to her left foot. The patient states that her pain increases with bearing weight. Dr. Zelaya has evaluated the patient and placed orders for a MRI. The patient has numbness in her bilateral feet. The patient has tried Gabapentin and Ibuprofen that has not help. The patient tried physical therapy that did not help. The patient denies any falls, injury, or trauma to her bilateral lower extremities.  No prior surgeries to the spine, no spinal injections.  The patient denies any other complaints at this time.     Nursing notes were reviewed.    REVIEW OF SYSTEMS    (2-9 systems for level 4, 10 or more for level 5)   ROS:  General:  No fevers, no chills, no weakness  Cardiovascular:  No chest pain, no palpitations  Respiratory:  No shortness of breath, no cough, no wheezing  Gastrointestinal:  No pain, no nausea, no vomiting, no diarrhea  Musculoskeletal:  No muscle pain, no joint pain. (+)back pain (+)leg pain  Skin:  No rash, no easy bruising  Neurologic:  No speech problems, no headache, (+)extremity numbness, no extremity tingling, no extremity weakness  Psychiatric:  No anxiety  Genitourinary:  No dysuria, no hematuria    Except as noted above the remainder of the review of systems was reviewed and negative.       PAST MEDICAL HISTORY     Past Medical History:   Diagnosis Date   • Chills    • Essential " hypertension 2016   • Foot pain    • History of abnormal cervical Pap smear     normal not abnormal -    • History of Papanicolaou smear of cervix    • Hypertension    • Numbness    • Pain    • Paresthesia    • Plantar fasciitis    • Pulmonary nodule    • Right ankle pain    • Tingling    • Vertigo    • Viral infection    • Viral infection    • Vitamin D deficiency          SURGICAL HISTORY       Past Surgical History:   Procedure Laterality Date   •  SECTION      x3   • TONSILLECTOMY  1995         CURRENT MEDICATIONS     No current facility-administered medications for this encounter.     Current Outpatient Medications:   •  cetirizine (ZYRTEC ALLERGY) 10 MG tablet, Take  by mouth., Disp: , Rfl:   •  cholecalciferol (VITAMIN D3) 1000 units tablet, Take 1,000 Units by mouth Daily., Disp: , Rfl:   •  gabapentin (NEURONTIN) 400 MG capsule, Take 1 capsule by mouth 3 (Three) Times a Day. Needs appt before further refills, Disp: 90 capsule, Rfl: 0  •  ibuprofen (ADVIL,MOTRIN) 800 MG tablet, TAKE 1 TABLET BY MOUTH EVERY 8 HOURS AS NEEDED FOR MILD PAIN, Disp: 90 tablet, Rfl: 0  •  levothyroxine (SYNTHROID) 200 MCG tablet, Take 1 tablet by mouth Daily., Disp: 30 tablet, Rfl: 4  •  lisinopril-hydrochlorothiazide (PRINZIDE,ZESTORETIC) 20-12.5 MG per tablet, Take 1 tablet by mouth Daily., Disp: 30 tablet, Rfl: 3  •  omeprazole (priLOSEC) 20 MG capsule, Take 20 mg by mouth Daily., Disp: , Rfl:   •  methylPREDNISolone (MEDROL, YENNIFER,) 4 MG tablet, Take as directed on package instructions., Disp: 1 each, Rfl: 0    ALLERGIES     Diclofenac and Voltaren [diclofenac sodium]    FAMILY HISTORY       Family History   Problem Relation Age of Onset   • Hypertension Mother    • Arthritis Mother    • Heart disease Father    • Diabetes Father    • Hypertension Father    • Stroke Father    • Heart attack Father    • Arthritis Father    • Obesity Father    • Alzheimer's disease Maternal Grandmother    • Cancer Maternal Grandmother     • Cancer Maternal Grandfather    • Alzheimer's disease Maternal Grandfather           SOCIAL HISTORY       Social History     Socioeconomic History   • Marital status:      Spouse name: Not on file   • Number of children: Not on file   • Years of education: Not on file   • Highest education level: Not on file   Tobacco Use   • Smoking status: Former Smoker     Packs/day: 2.00     Years: 20.00     Pack years: 40.00     Last attempt to quit: 2010     Years since quittin.9   • Smokeless tobacco: Never Used   • Tobacco comment: 1.5-2 ppd for 20 yrs   Substance and Sexual Activity   • Alcohol use: No   • Drug use: No         PHYSICAL EXAM    (up to 7 for level 4, 8 or more for level 5)     Vitals:    19 2330 12/15/19 0016 12/15/19 0030 12/15/19 0045   BP:  135/85 118/78    BP Location:       Patient Position:       Pulse:       Resp:       Temp:       TempSrc:       SpO2: 96% 96% 96% 97%   Weight:       Height:           Physical Exam  General :Patient is awake, alert, oriented, in no acute distress, nontoxic appearing  HEENT: Pupils are equally round and reactive to light, EOMI, conjunctivae clear, sclerae white, there is no injection no icterus.  Oral mucosa is moist, no exudate. Uvula is midline  Neck: Neck is supple, full range of motion, trachea midline  Cardiac: Heart regular rate, rhythm, no murmurs, rubs, or gallops  Lungs: Lungs are clear to auscultation, there is no wheezing, rhonchi, or rales. There is no use of accessory muscles.  Chest wall: There is no tenderness to palpation over the chest wall or over ribs  Abdomen: Abdomen is soft, nontender, nondistended. There is no firm or pulsatile masses, no rebound rigidity or guarding.   Musculoskeletal: The patient has tenderness to palpation of the left lumbar paraspinal musculature and left SI joint. The patient has no midline step off or deformity.   Neuro: Sensory intact, level of consciousness is normal, cerebellar function is normal,  reflexes are grossly normal. No evidence of incontinence or loss of bowel or bladder function, no saddle anesthesia noted. Positive straight leg raise on the left.  Patient is ambulatory.  Dermatology: Skin is warm and dry  Psych: Mentation is grossly normal, cognition is grossly normal. Affect is appropriate.      DIAGNOSTIC RESULTS     EKG: All EKG's are interpreted by the Emergency Department Physician who either signs or Co-signs this chart in the absence of a cardiologist.    No orders to display       RADIOLOGY:   Non-plain film images such as CT, Ultrasound and MRI are read by the radiologist. Plain radiographic images are visualized and preliminarily interpreted by the emergency physician with the below findings:      [] Radiologist's Report Reviewed:  MRI Lumbar Spine Without Contrast   Final Result   Negative MRI examination of the lumbar spine.      Signer Name: Arun Cano MD    Signed: 12/15/2019 1:27 AM    Workstation [facility]: [facility]     Radiology [facility]      XR Pelvis 1 or 2 View   Final Result   Negative AP pelvis and hips.      Signer Name: Arun Cano MD    Signed: 12/14/2019 10:51 PM    Workstation Name: [facility]     [facility]            ED BEDSIDE ULTRASOUND:   Performed by ED Physician - none    LABS:    I have reviewed and interpreted all of the currently available lab results from this visit (if applicable):  Results for orders placed or performed in visit on 05/21/19   DENVER   Result Value Ref Range    DENVER Direct Negative Negative   Anti-DNA Antibody, Double-stranded   Result Value Ref Range    Anti-DNA (DS) Ab Qn <1 0 - 9 IU/mL   Celiac Disease Panel   Result Value Ref Range    Endomysial IgA Negative Negative    Tissue Transglutaminase IgA <2 0 - 3 U/mL    IgA 101 87 - 352 mg/dL   C-reactive Protein   Result Value Ref Range    C-Reactive Protein 1.08 (H) 0.00 - 0.50 mg/dL   Cryoglobulin   Result Value Ref Range     Cryoglobulin, Ql, Serum, Rflx Comment None detected   Lyme Disease IgG/IgM Antibodies   Result Value Ref Range    Lyme Ab IgG Negative Negative    Lyme Ab IgM Negative Negative   Protein Elec + Interp, Serum   Result Value Ref Range    Total Protein 6.3 6.0 - 8.5 g/dL    Albumin 3.3 2.9 - 4.4 g/dL    Alpha-1-Globulin 0.2 0.0 - 0.4 g/dL    Alpha-2-Globulin 0.8 0.4 - 1.0 g/dL    Beta Globulin 1.3 0.7 - 1.3 g/dL    Gamma Globulin 0.7 0.4 - 1.8 g/dL    M-Dio Not Observed Not Observed g/dL    Globulin 3.0 2.2 - 3.9 g/dL    A/G Ratio 1.1 0.7 - 1.7    Please note Comment     SPE Interpretation Comment    RPR   Result Value Ref Range    RPR Non-Reactive Non-Reactive   Sedimentation Rate   Result Value Ref Range    Sed Rate 17 0 - 20 mm/hr   Sjogren's Antibody, Anti-SS-A / -SS-B   Result Value Ref Range    TRACI SSA (RO) Ab <0.2 0.0 - 0.9 AI    TRACI SSB (LA) Ab <0.2 0.0 - 0.9 AI   Vitamin B12 & Folate   Result Value Ref Range    Folate >20.00 4.78 - 24.20 ng/mL    Vitamin B-12 460 211 - 946 pg/mL        All other labs were within normal range or not returned as of this dictation.      EMERGENCY DEPARTMENT COURSE and DIFFERENTIAL DIAGNOSIS/MDM:   Vitals:    Vitals:    12/14/19 2330 12/15/19 0016 12/15/19 0030 12/15/19 0045   BP:  135/85 118/78    BP Location:       Patient Position:       Pulse:       Resp:       Temp:       TempSrc:       SpO2: 96% 96% 96% 97%   Weight:       Height:           ED Course as of Dec 15 0203   Sat Dec 14, 2019   2200 [id number]. VARUN query complete. Treatment plan to include limited course of prescribed  controlled substance. Risks including addiction, benefits, and alternatives presented to patient.   -LEXIE    [CN]      ED Course User Index  [CN] Tasha Britt   !    Patient with increasing lower back pain with left-sided sciatica with no signs of acute discitis or cauda equina syndrome on examination.  Symptoms are worsening, no advanced imaging is been obtained to date.  She does follow with  neuro specialist, Dr. Zelaya.  Currently on gabapentin, Motrin for anti-inflammatory.  Appears to have underlying radicular symptoms, likely from disc herniation.  With her increasing symptoms, we will obtain this imaging for further work-up.  Imaging results as above, no significant abnormalities are noted.  I discussed these results with the patient, the need for continued therapy as well as continuing her gabapentin, she does have room to move up on her dosage.  We will treat with anti-inflammatories, Decadron, she will call her neurology specialist for reevaluation and further treatment options. The patient will follow-up with their PCP in 1-2 days for reevaluation.  If the patient or family members have any further concerns or patient has any worsening symptoms they will return to the ED for reevaluation.    I estimate there is LOW risk for (including but not limited to) RAPIDLY EXPANDING OR RUPTURED AAA, AORTIC DISSECTION, CAUDA EQUINA SYNDROME, or EPIDURAL MASS LESION thus I consider the discharge disposition reasonable. Tawana Christianson (or their surrogate) and I have discussed the diagnosis and risks, and we agree with discharging home with close follow-up. We also discussed returning to the Emergency Department immediately if new or worsening symptoms occur. We have discussed the symptoms which are most concerning that necessitate immediate return.        MEDICATIONS ADMINISTERED IN ED:  Medications - No data to display    PROCEDURES:  Procedures    CRITICAL CARE TIME    Total Critical Care time was 0 minutes, excluding separately reportable procedures.   There was a high probability of clinically significant/life threatening deterioration in the patient's condition which required my urgent intervention.      FINAL IMPRESSION      1. Chronic left-sided low back pain with left-sided sciatica          DISPOSITION/PLAN     ED Disposition     ED Disposition Condition Comment    Discharge Stable           PATIENT  REFERRED TO:  Reinaldo Zelaya MD  [address]  [address]  [facility] [address] [phone]    Schedule an appointment as soon as possible for a visit   Neurology    Pedrito Tobin MD  [address] [facility] [address] [phone]    In 2 days      [facility] Emergency Department  [address] [facility] [address] [phone]    If symptoms worsen      DISCHARGE MEDICATIONS:     Medication List      START taking these medications    methylPREDNISolone 4 MG tablet  Commonly known as:  MEDROL (YENNIFER)  Take as directed on package instructions.        CONTINUE taking these medications    cholecalciferol 25 MCG (1000 UT) tablet  Commonly known as:  VITAMIN D3     gabapentin 400 MG capsule  Commonly known as:  NEURONTIN  Take 1 capsule by mouth 3 (Three) Times a Day. Needs appt before further   refills     ibuprofen 800 MG tablet  Commonly known as:  ADVIL,MOTRIN  TAKE 1 TABLET BY MOUTH EVERY 8 HOURS AS NEEDED FOR MILD PAIN     levothyroxine 200 MCG tablet  Commonly known as:  SYNTHROID  Take 1 tablet by mouth Daily.     lisinopril-hydrochlorothiazide 20-12.5 MG per tablet  Commonly known as:  PRINZIDE,ZESTORETIC  Take 1 tablet by mouth Daily.     omeprazole 20 MG capsule  Commonly known as:  priLOSEC     ZYRTEC ALLERGY 10 MG tablet  Generic drug:  cetirizine        STOP taking these medications    cyclobenzaprine 10 MG tablet  Commonly known as:  FLEXERIL     olmesartan-hydrochlorothiazide 20-12.5 MG per tablet  Commonly known as:  BENICAR HCT     tobramycin 0.3 % solution ophthalmic solution  Commonly known as:  TOBREX            Documentation assistance provided by Tasha Britt acting as scribe for Dr. Adarsh Ortiz.     The scribe's documentation has been prepared under my direction and personally reviewed by me in its entirety.  I confirm that the note above accurately reflects all work, treatment, procedures, and medical decision making performed by  me.      Comment: Please note this report has been produced using speech recognition software.      Adarsh Ortiz DO  Attending Emergency Physician                 Tasha Britt  12/14/19 [id number]       Adarsh Ortiz DO  12/15/19 0202

## 2019-12-17 ENCOUNTER — OFFICE VISIT (OUTPATIENT)
Dept: NEUROLOGY | Facility: CLINIC | Age: 44
End: 2019-12-17

## 2019-12-17 VITALS
DIASTOLIC BLOOD PRESSURE: 80 MMHG | SYSTOLIC BLOOD PRESSURE: 124 MMHG | BODY MASS INDEX: 47.84 KG/M2 | WEIGHT: 270 LBS | HEIGHT: 63 IN

## 2019-12-17 DIAGNOSIS — M25.552 LEFT HIP PAIN: Primary | ICD-10-CM

## 2019-12-17 DIAGNOSIS — G89.29 CHRONIC BILATERAL LOW BACK PAIN WITH BILATERAL SCIATICA: ICD-10-CM

## 2019-12-17 DIAGNOSIS — M54.41 CHRONIC BILATERAL LOW BACK PAIN WITH BILATERAL SCIATICA: ICD-10-CM

## 2019-12-17 DIAGNOSIS — M54.42 CHRONIC BILATERAL LOW BACK PAIN WITH BILATERAL SCIATICA: ICD-10-CM

## 2019-12-17 DIAGNOSIS — G62.9 NEUROPATHY: ICD-10-CM

## 2019-12-17 PROCEDURE — 99214 OFFICE O/P EST MOD 30 MIN: CPT | Performed by: PSYCHIATRY & NEUROLOGY

## 2019-12-17 RX ORDER — GABAPENTIN 400 MG/1
400 CAPSULE ORAL 3 TIMES DAILY
Qty: 90 CAPSULE | Refills: 3 | Status: SHIPPED | OUTPATIENT
Start: 2019-12-17 | End: 2020-02-12 | Stop reason: SDUPTHER

## 2019-12-17 RX ORDER — PREDNISONE 1 MG/1
5 TABLET ORAL DAILY
COMMUNITY
End: 2021-01-04

## 2019-12-17 NOTE — PROGRESS NOTES
Subjective:    CC: Tawana Christianson is in clinic today for follow up for peripheral neuropathy, chronic low back pain.    HPI:  She is in clinic for regular follow-up.  Since last visit, she had MRI of lumbosacral spine because of her complaint of chronic low back pain.  I reviewed this MRI personally and it did not reveal any spondylitic changes or abnormality.  Essentially normal MRI of lumbosacral spine with normal neuroforamina and spinal canal.  She reports that 3 days ago, she had to go to ER because of sudden increase in left hip pain.  She reports that it became very difficult for her to bear weight on the leg because of this left hip pain.  She reports that intermittently, she has had left hip pain for many years now.  She was given a prescription of Medrol Dosepak which she reports is helping little bit.  She denies any problems with bowel or bladder control.  She has never had MRI of left HIP for further evaluation.    The following portions of the patient's history were reviewed and updated as of 12/17/2019: allergies, social history and problem list.       Current Outpatient Medications:   •  cetirizine (ZYRTEC ALLERGY) 10 MG tablet, Take  by mouth., Disp: , Rfl:   •  cholecalciferol (VITAMIN D3) 1000 units tablet, Take 1,000 Units by mouth Daily., Disp: , Rfl:   •  gabapentin (NEURONTIN) 400 MG capsule, Take 1 capsule by mouth 3 (Three) Times a Day. Needs appt before further refills, Disp: 90 capsule, Rfl: 3  •  ibuprofen (ADVIL,MOTRIN) 800 MG tablet, TAKE 1 TABLET BY MOUTH EVERY 8 HOURS AS NEEDED FOR MILD PAIN, Disp: 90 tablet, Rfl: 0  •  levothyroxine (SYNTHROID) 200 MCG tablet, Take 1 tablet by mouth Daily., Disp: 30 tablet, Rfl: 4  •  lisinopril-hydrochlorothiazide (PRINZIDE,ZESTORETIC) 20-12.5 MG per tablet, Take 1 tablet by mouth Daily., Disp: 30 tablet, Rfl: 3  •  methylPREDNISolone (MEDROL, YENNIFER,) 4 MG tablet, Take as directed on package instructions., Disp: 1 each, Rfl: 0  •  omeprazole  "(priLOSEC) 20 MG capsule, Take 20 mg by mouth Daily., Disp: , Rfl:   •  predniSONE (DELTASONE) 5 MG tablet, Take 5 mg by mouth Daily., Disp: , Rfl:    Past Medical History:   Diagnosis Date   • Chills    • Essential hypertension 2016   • Foot pain    • History of abnormal cervical Pap smear     normal not abnormal -    • History of Papanicolaou smear of cervix    • Hypertension    • Numbness    • Pain    • Paresthesia    • Plantar fasciitis    • Pulmonary nodule    • Right ankle pain    • Tingling    • Vertigo    • Viral infection    • Viral infection    • Vitamin D deficiency       Past Surgical History:   Procedure Laterality Date   •  SECTION      x3   • TONSILLECTOMY  1995      Family History   Problem Relation Age of Onset   • Hypertension Mother    • Arthritis Mother    • Heart disease Father    • Diabetes Father    • Hypertension Father    • Stroke Father    • Heart attack Father    • Arthritis Father    • Obesity Father    • Alzheimer's disease Maternal Grandmother    • Cancer Maternal Grandmother    • Cancer Maternal Grandfather    • Alzheimer's disease Maternal Grandfather         Review of Systems   Constitutional: Positive for fatigue.   Cardiovascular: Positive for palpitations and leg swelling.   Endocrine: Positive for cold intolerance.   Musculoskeletal: Positive for arthralgias, back pain and gait problem.   Neurological: Positive for tremors, weakness and numbness.   Psychiatric/Behavioral: Positive for sleep disturbance and depressed mood. The patient is nervous/anxious.      Objective:    /80   Ht 160 cm (63\")   Wt 122 kg (270 lb)   LMP 2019   BMI 47.83 kg/m²     Neurology Exam:  General apperance: NAD.     Mental status: Alert, awake and oriented to time place and person.    Recent and Remote memory: Can recall 3/3 objects at 5 minutes. Can recall historical events.     Attention span and Concentration: Serial 7s: Normal.     Fund of knowledge:  Normal.     Language " and Speech: No aphasia or dysarthria.    Naming , Repitition and Comprehension:  Can name objects, repeat a sentence and follow commands. Speech is clear and fluent with good repetition, comprehension, and naming.    CN II to XII: Intact.    Opthalmoscopic Exam: No papilledema.    Motor:  Right UE muscle strength 4/5.  Giveaway weakness noted.  Normal tone.     Left UE muscle strength 4/5.  Giveaway weakness noted.  Normal tone.      Right LE muscle strength5/5. Normal tone.     Left LE muscle strength 5/5. Normal tone.      Sensory: Normal light touch, vibration and pinprick sensation bilaterally.    DTRs: 1+ bilaterally.    Babinski: Negative bilaterally.    Co-ordination: Normal finger-to-nose, heel to shin B/L.    Rhomberg: Negative.    Gait: Antalgic gait on the left.    Cardiovascular: Regular rate and rhythm without murmur, gallop or rub.    Assessment and Plan:  1. Left hip pain  -Patient with history of intermittent left hip pain which became worse 3 days ago.  MRI lumbosacral spine done few months ago was not absolutely normal without any spondylitic changes.  Since MRI of hip has never been performed, it will be ordered for further evaluation.  We will also schedule her for physical therapy.  - MRI hip left wo contrast; Future    2. Chronic bilateral low back pain with bilateral sciatica  -She reports that gabapentin for 100 mg 3 times daily does help with low back pain.  She denies any worsening in her chronic low back pain.  - gabapentin (NEURONTIN) 400 MG capsule; Take 1 capsule by mouth 3 (Three) Times a Day. Needs appt before further refills  Dispense: 90 capsule; Refill: 3    3. Neuropathy  -Continue with gabapentin 400 mg 3 times daily as it does help with the symptoms.  I will see her back in 6 to 8 weeks for follow-up.  - gabapentin (NEURONTIN) 400 MG capsule; Take 1 capsule by mouth 3 (Three) Times a Day. Needs appt before further refills  Dispense: 90 capsule; Refill: 3       I spent 15 minutes  face to face with the patient and spent more than 50% of this time  in management, instructions and education regarding above mentioned diagnosis and also on counseling and discussing about taking medication regularly, possible side effects with medication use, importance of good sleep hygiene, good hydration and regular exercise.    Return in about 8 weeks (around 2/11/2020).

## 2019-12-18 ENCOUNTER — TELEPHONE (OUTPATIENT)
Dept: NEUROLOGY | Facility: CLINIC | Age: 44
End: 2019-12-18

## 2019-12-18 RX ORDER — CYCLOBENZAPRINE HCL 10 MG
10 TABLET ORAL NIGHTLY
Qty: 21 TABLET | Refills: 1 | Status: SHIPPED | OUTPATIENT
Start: 2019-12-18 | End: 2020-01-08

## 2019-12-18 NOTE — TELEPHONE ENCOUNTER
----- Message from Jennifer Das sent at 12/18/2019  8:21 AM EST -----  Contact: [phone]  Dr. Zelaya,    Pt called to check on the status of her Rx for gabapentin (NEURONTIN) 400 MG capsule. It looks like this was printed. Please advise.

## 2019-12-18 NOTE — TELEPHONE ENCOUNTER
----- Message from Jennifer Thiago sent at 12/18/2019  8:17 AM EST -----  Contact: [phone]  Dr. Zelaya,    Pt called asking if Dr. Zelaya could possibly call in some muscle relaxer's for her, due to her back muscles always cramping up. Please advise.

## 2019-12-19 ENCOUNTER — TELEPHONE (OUTPATIENT)
Dept: NEUROLOGY | Facility: CLINIC | Age: 44
End: 2019-12-19

## 2020-01-03 ENCOUNTER — HOSPITAL ENCOUNTER (OUTPATIENT)
Dept: MRI IMAGING | Facility: HOSPITAL | Age: 45
Discharge: HOME | End: 2020-01-03
Admitting: PSYCHIATRY & NEUROLOGY

## 2020-01-03 DIAGNOSIS — M25.552 LEFT HIP PAIN: ICD-10-CM

## 2020-01-03 PROCEDURE — 73721 MRI JNT OF LWR EXTRE W/O DYE: CPT

## 2020-01-07 ENCOUNTER — TELEPHONE (OUTPATIENT)
Dept: NEUROLOGY | Facility: CLINIC | Age: 45
End: 2020-01-07

## 2020-01-07 NOTE — TELEPHONE ENCOUNTER
----- Message from Reinaldo Zelaya MD sent at 1/7/2020  1:59 PM EST -----  Inform patient normal.  MRI of left hip is normal.

## 2020-02-10 ENCOUNTER — TELEPHONE (OUTPATIENT)
Dept: NEUROLOGY | Facility: CLINIC | Age: 45
End: 2020-02-10

## 2020-02-10 DIAGNOSIS — G89.29 CHRONIC BILATERAL LOW BACK PAIN WITH BILATERAL SCIATICA: ICD-10-CM

## 2020-02-10 DIAGNOSIS — M54.41 CHRONIC BILATERAL LOW BACK PAIN WITH BILATERAL SCIATICA: ICD-10-CM

## 2020-02-10 DIAGNOSIS — M54.42 CHRONIC BILATERAL LOW BACK PAIN WITH BILATERAL SCIATICA: ICD-10-CM

## 2020-02-10 DIAGNOSIS — M25.552 LEFT HIP PAIN: Primary | ICD-10-CM

## 2020-02-12 ENCOUNTER — OFFICE VISIT (OUTPATIENT)
Dept: NEUROLOGY | Facility: CLINIC | Age: 45
End: 2020-02-12

## 2020-02-12 VITALS
BODY MASS INDEX: 47.84 KG/M2 | WEIGHT: 270 LBS | SYSTOLIC BLOOD PRESSURE: 118 MMHG | HEIGHT: 63 IN | DIASTOLIC BLOOD PRESSURE: 72 MMHG

## 2020-02-12 DIAGNOSIS — G89.29 CHRONIC BILATERAL LOW BACK PAIN WITH BILATERAL SCIATICA: ICD-10-CM

## 2020-02-12 DIAGNOSIS — G62.9 NEUROPATHY: ICD-10-CM

## 2020-02-12 DIAGNOSIS — M25.552 LEFT HIP PAIN: Primary | ICD-10-CM

## 2020-02-12 DIAGNOSIS — M54.42 CHRONIC BILATERAL LOW BACK PAIN WITH BILATERAL SCIATICA: ICD-10-CM

## 2020-02-12 DIAGNOSIS — M54.41 CHRONIC BILATERAL LOW BACK PAIN WITH BILATERAL SCIATICA: ICD-10-CM

## 2020-02-12 PROCEDURE — 99214 OFFICE O/P EST MOD 30 MIN: CPT | Performed by: PSYCHIATRY & NEUROLOGY

## 2020-02-12 RX ORDER — FERROUS SULFATE 325(65) MG
1 TABLET ORAL 2 TIMES DAILY
COMMUNITY
Start: 2020-01-28

## 2020-02-12 RX ORDER — GABAPENTIN 400 MG/1
400 CAPSULE ORAL 3 TIMES DAILY
Qty: 90 CAPSULE | Refills: 3 | Status: SHIPPED | OUTPATIENT
Start: 2020-02-12 | End: 2020-05-18 | Stop reason: SDUPTHER

## 2020-02-12 RX ORDER — METHYLPREDNISOLONE 4 MG/1
TABLET ORAL
Qty: 1 EACH | Refills: 0 | Status: SHIPPED | OUTPATIENT
Start: 2020-02-12 | End: 2021-01-04

## 2020-02-12 NOTE — PROGRESS NOTES
Subjective:    CC: Tawana Christianson is in clinic today for follow up for left hip pain, chronic low back pain, neuropathy.    HPI:  She is in clinic for regular follow-up.  Since her last visit, she reports that she continues to have intermittent left hip pain, chronic low back pain.  She underwent MRI of the left hip and it was essentially unremarkable.  MRI of low back has been unremarkable as well.  She has not yet started physical therapy but will be scheduling it this week for both left hip and for the low back.  She continues to take gabapentin 400 mg 3 times a day and it seems to be working well for neuropathy.  She is reporting intermittent headache which has been going on for last 6 weeks.  It is of bifrontal in nature and mild to moderate in intensity.    The following portions of the patient's history were reviewed and updated as of 02/12/2020: allergies, social history and problem list.       Current Outpatient Medications:   •  cetirizine (ZYRTEC ALLERGY) 10 MG tablet, Take  by mouth., Disp: , Rfl:   •  cholecalciferol (VITAMIN D3) 1000 units tablet, Take 1,000 Units by mouth Daily., Disp: , Rfl:   •  FEROSUL 325 (65 Fe) MG tablet, Take 1 tablet by mouth 2 (Two) Times a Day., Disp: , Rfl:   •  gabapentin (NEURONTIN) 400 MG capsule, Take 1 capsule by mouth 3 (Three) Times a Day. Needs appt before further refills, Disp: 90 capsule, Rfl: 3  •  ibuprofen (ADVIL,MOTRIN) 800 MG tablet, TAKE 1 TABLET BY MOUTH EVERY 8 HOURS AS NEEDED FOR MILD PAIN, Disp: 90 tablet, Rfl: 0  •  levothyroxine (SYNTHROID) 200 MCG tablet, Take 1 tablet by mouth Daily., Disp: 30 tablet, Rfl: 4  •  lisinopril-hydrochlorothiazide (PRINZIDE,ZESTORETIC) 20-12.5 MG per tablet, Take 1 tablet by mouth Daily., Disp: 30 tablet, Rfl: 3  •  methylPREDNISolone (MEDROL, YENNIFER,) 4 MG tablet, Take as directed on package instructions., Disp: 1 each, Rfl: 0  •  omeprazole (priLOSEC) 20 MG capsule, Take 20 mg by mouth Daily., Disp: , Rfl:   •   "predniSONE (DELTASONE) 5 MG tablet, Take 5 mg by mouth Daily., Disp: , Rfl:   •  methylPREDNISolone (MEDROL, YENNIFER,) 4 MG tablet, Take as directed on package instructions., Disp: 1 each, Rfl: 0   Past Medical History:   Diagnosis Date   • Chills    • Essential hypertension 2016   • Foot pain    • History of abnormal cervical Pap smear     normal not abnormal -    • History of Papanicolaou smear of cervix    • Hypertension    • Numbness    • Pain    • Paresthesia    • Plantar fasciitis    • Pulmonary nodule    • Right ankle pain    • Tingling    • Vertigo    • Viral infection    • Viral infection    • Vitamin D deficiency       Past Surgical History:   Procedure Laterality Date   •  SECTION      x3   • TONSILLECTOMY        Family History   Problem Relation Age of Onset   • Hypertension Mother    • Arthritis Mother    • Heart disease Father    • Diabetes Father    • Hypertension Father    • Stroke Father    • Heart attack Father    • Arthritis Father    • Obesity Father    • Alzheimer's disease Maternal Grandmother    • Cancer Maternal Grandmother    • Cancer Maternal Grandfather    • Alzheimer's disease Maternal Grandfather         Review of Systems   Constitutional: Positive for fatigue.   Eyes: Positive for visual disturbance.   Cardiovascular: Positive for leg swelling.   Musculoskeletal: Positive for back pain and gait problem.   Neurological: Positive for numbness and headache.   Psychiatric/Behavioral: The patient is nervous/anxious.      Objective:    /72   Ht 160 cm (63\")   Wt 122 kg (270 lb)   BMI 47.83 kg/m²     Neurology Exam:  General apperance: NAD.     Mental status: Alert, awake and oriented to time place and person.    Recent and Remote memory: Can recall 3/3 objects at 5 minutes. Can recall historical events.     Attention span and Concentration: Serial 7s: Normal.     Fund of knowledge:  Normal.     Language and Speech: No aphasia or dysarthria.    Naming , Repitition and " Comprehension:  Can name objects, repeat a sentence and follow commands. Speech is clear and fluent with good repetition, comprehension, and naming.    CN II to XII: Intact.    Opthalmoscopic Exam: No papilledema.    Motor:  Right UE muscle strength 5/5. Normal tone.     Left UE muscle strength 5/5. Normal tone.      Right LE muscle strength5/5. Normal tone.     Left LE muscle strength 5/5. Normal tone.      Sensory: Normal light touch, vibration and pinprick sensation bilaterally.    DTRs: 2+ bilaterally.    Babinski: Negative bilaterally.    Co-ordination: Normal finger-to-nose, heel to shin B/L.    Rhomberg: Negative.    Gait: Normal.    Cardiovascular: Regular rate and rhythm without murmur, gallop or rub.    Assessment and Plan:  1. Left hip pain  2. Neuropathy  3. Chronic bilateral low back pain with bilateral sciatica  -She will be starting physical therapy for low back pain and left hip pain and that should help as so far, MRI of lumbosacral spine and MRI of left hip did not reveal abnormalities and was essentially normal.  She is reporting intermittent mild to moderate intensity headache which started about 6 weeks ago.  Will prescribe her Medrol Dosepak to break the cycle of headache.  Continue gabapentin 400 mg 3 times daily for neuropathy and I will see her back in 3 months for follow-up.    - gabapentin (NEURONTIN) 400 MG capsule; Take 1 capsule by mouth 3 (Three) Times a Day. Needs appt before further refills  Dispense: 90 capsule; Refill: 3    I spent 25 minutes face to face with the patient and spent more than 50% of this time  in management, instructions and education regarding above mentioned diagnosis and also on counseling and discussing about taking medication regularly, possible side effects with medication use, importance of good sleep hygiene, good hydration and regular exercise.    Return in about 3 months (around 5/12/2020).

## 2020-05-18 DIAGNOSIS — M54.42 CHRONIC BILATERAL LOW BACK PAIN WITH BILATERAL SCIATICA: ICD-10-CM

## 2020-05-18 DIAGNOSIS — G62.9 NEUROPATHY: ICD-10-CM

## 2020-05-18 DIAGNOSIS — M54.41 CHRONIC BILATERAL LOW BACK PAIN WITH BILATERAL SCIATICA: ICD-10-CM

## 2020-05-18 DIAGNOSIS — G89.29 CHRONIC BILATERAL LOW BACK PAIN WITH BILATERAL SCIATICA: ICD-10-CM

## 2020-05-18 RX ORDER — GABAPENTIN 400 MG/1
400 CAPSULE ORAL 3 TIMES DAILY
Qty: 90 CAPSULE | Refills: 3 | Status: SHIPPED | OUTPATIENT
Start: 2020-05-18 | End: 2020-05-19

## 2020-05-19 DIAGNOSIS — M54.41 CHRONIC BILATERAL LOW BACK PAIN WITH BILATERAL SCIATICA: ICD-10-CM

## 2020-05-19 DIAGNOSIS — G89.29 CHRONIC BILATERAL LOW BACK PAIN WITH BILATERAL SCIATICA: ICD-10-CM

## 2020-05-19 DIAGNOSIS — M54.42 CHRONIC BILATERAL LOW BACK PAIN WITH BILATERAL SCIATICA: ICD-10-CM

## 2020-05-19 DIAGNOSIS — G62.9 NEUROPATHY: ICD-10-CM

## 2020-05-19 RX ORDER — GABAPENTIN 400 MG/1
CAPSULE ORAL
Qty: 90 CAPSULE | Refills: 3 | Status: SHIPPED | OUTPATIENT
Start: 2020-05-19 | End: 2020-06-25 | Stop reason: DRUGHIGH

## 2020-06-25 ENCOUNTER — OFFICE VISIT (OUTPATIENT)
Dept: NEUROLOGY | Facility: CLINIC | Age: 45
End: 2020-06-25

## 2020-06-25 VITALS
WEIGHT: 271 LBS | SYSTOLIC BLOOD PRESSURE: 120 MMHG | HEIGHT: 63 IN | DIASTOLIC BLOOD PRESSURE: 74 MMHG | TEMPERATURE: 98.3 F | BODY MASS INDEX: 48.02 KG/M2

## 2020-06-25 DIAGNOSIS — G89.29 CHRONIC BILATERAL LOW BACK PAIN WITH BILATERAL SCIATICA: Primary | ICD-10-CM

## 2020-06-25 DIAGNOSIS — G62.9 NEUROPATHY: ICD-10-CM

## 2020-06-25 DIAGNOSIS — M54.42 CHRONIC BILATERAL LOW BACK PAIN WITH BILATERAL SCIATICA: Primary | ICD-10-CM

## 2020-06-25 DIAGNOSIS — M54.41 CHRONIC BILATERAL LOW BACK PAIN WITH BILATERAL SCIATICA: Primary | ICD-10-CM

## 2020-06-25 PROCEDURE — 99214 OFFICE O/P EST MOD 30 MIN: CPT | Performed by: PSYCHIATRY & NEUROLOGY

## 2020-06-25 RX ORDER — GABAPENTIN 600 MG/1
600 TABLET ORAL 3 TIMES DAILY
Qty: 90 TABLET | Refills: 3 | Status: SHIPPED | OUTPATIENT
Start: 2020-06-25 | End: 2021-01-04 | Stop reason: SDUPTHER

## 2020-06-25 RX ORDER — FUROSEMIDE 20 MG/1
TABLET ORAL DAILY
COMMUNITY
Start: 2020-05-27 | End: 2021-01-04

## 2020-06-25 RX ORDER — MELOXICAM 15 MG/1
TABLET ORAL
COMMUNITY
Start: 2020-06-23 | End: 2021-01-04

## 2020-06-25 NOTE — PROGRESS NOTES
Subjective:    CC: Tawana Christianson is in clinic today for follow up for neuropathy and chronic low back pain.    HPI:    2/12/2020: She is in clinic for regular follow-up.  Since her last visit, she reports that she continues to have intermittent left hip pain, chronic low back pain.  She underwent MRI of the left hip and it was essentially unremarkable.  MRI of low back has been unremarkable as well.  She has not yet started physical therapy but will be scheduling it this week for both left hip and for the low back.  She continues to take gabapentin 400 mg 3 times a day and it seems to be working well for neuropathy.  She is reporting intermittent headache which has been going on for last 6 weeks.  It is of bifrontal in nature and mild to moderate in intensity.    6/25/2020: She is in clinic for regular follow-up.  She reports that her back pain and neuropathy symptoms are worse.  She had a fall few weeks ago and since then the back pain has become worse.  She did start physical therapy back in end of February but then it was closed with 2 pandemic.  She will be calling physical therapy soon to reschedule restart therapy.  The past, she has had MRI of the left hip and MRI of the lumbosacral spine which were both unremarkable.  She denies any side effects with gabapentin use    The following portions of the patient's history were reviewed and updated as of 06/25/2020: allergies, social history and problem list.       Current Outpatient Medications:   •  cetirizine (ZYRTEC ALLERGY) 10 MG tablet, Take  by mouth., Disp: , Rfl:   •  cholecalciferol (VITAMIN D3) 1000 units tablet, Take 1,000 Units by mouth Daily., Disp: , Rfl:   •  FEROSUL 325 (65 Fe) MG tablet, Take 1 tablet by mouth 2 (Two) Times a Day., Disp: , Rfl:   •  furosemide (LASIX) 20 MG tablet, Take  by mouth Daily., Disp: , Rfl:   •  ibuprofen (ADVIL,MOTRIN) 800 MG tablet, TAKE 1 TABLET BY MOUTH EVERY 8 HOURS AS NEEDED FOR MILD PAIN, Disp: 90 tablet, Rfl:  0  •  levothyroxine (SYNTHROID) 200 MCG tablet, Take 1 tablet by mouth Daily. (Patient taking differently: Take 224 mcg by mouth Daily.), Disp: 30 tablet, Rfl: 4  •  lisinopril-hydrochlorothiazide (PRINZIDE,ZESTORETIC) 20-12.5 MG per tablet, Take 1 tablet by mouth Daily., Disp: 30 tablet, Rfl: 3  •  meloxicam (MOBIC) 15 MG tablet, , Disp: , Rfl:   •  methylPREDNISolone (MEDROL, YENNIFER,) 4 MG tablet, Take as directed on package instructions., Disp: 1 each, Rfl: 0  •  methylPREDNISolone (MEDROL, YENNIFER,) 4 MG tablet, Take as directed on package instructions., Disp: 1 each, Rfl: 0  •  omeprazole (priLOSEC) 20 MG capsule, Take 20 mg by mouth Daily., Disp: , Rfl:   •  predniSONE (DELTASONE) 5 MG tablet, Take 5 mg by mouth Daily., Disp: , Rfl:   •  gabapentin (NEURONTIN) 600 MG tablet, Take 1 tablet by mouth 3 (Three) Times a Day for 30 days., Disp: 90 tablet, Rfl: 3   Past Medical History:   Diagnosis Date   • Chills    • Essential hypertension 2016   • Foot pain    • History of abnormal cervical Pap smear     normal not abnormal -    • History of Papanicolaou smear of cervix    • Hypertension    • Numbness    • Pain    • Paresthesia    • Plantar fasciitis    • Pulmonary nodule    • Right ankle pain    • Tingling    • Vertigo    • Viral infection    • Viral infection    • Vitamin D deficiency       Past Surgical History:   Procedure Laterality Date   •  SECTION      x3   • TONSILLECTOMY        Family History   Problem Relation Age of Onset   • Hypertension Mother    • Arthritis Mother    • Heart disease Father    • Diabetes Father    • Hypertension Father    • Stroke Father    • Heart attack Father    • Arthritis Father    • Obesity Father    • Alzheimer's disease Maternal Grandmother    • Cancer Maternal Grandmother    • Cancer Maternal Grandfather    • Alzheimer's disease Maternal Grandfather         Review of Systems   Cardiovascular: Positive for leg swelling.   Musculoskeletal: Positive for arthralgias,  "back pain and myalgias.   Neurological: Positive for numbness.   Psychiatric/Behavioral: The patient is nervous/anxious.    All other systems reviewed and are negative.    Objective:    /74   Temp 98.3 °F (36.8 °C)   Ht 160 cm (63\")   Wt 123 kg (271 lb)   BMI 48.01 kg/m²     Neurology Exam:  General apperance: NAD.     Mental status: Alert, awake and oriented to time place and person.    Recent and Remote memory: Can recall 3/3 objects at 5 minutes. Can recall historical events.     Attention span and Concentration: Serial 7s: Normal.     Fund of knowledge:  Normal.     Language and Speech: No aphasia or dysarthria.    Naming , Repitition and Comprehension:  Can name objects, repeat a sentence and follow commands. Speech is clear and fluent with good repetition, comprehension, and naming.    CN II to XII: Intact.    Opthalmoscopic Exam: No papilledema.    Motor:  Right UE muscle strength 5/5. Normal tone.     Left UE muscle strength 5/5. Normal tone.      Right LE muscle strength5/5. Normal tone.     Left LE muscle strength 5/5. Normal tone.      Sensory: Normal light touch, vibration and pinprick sensation bilaterally.    DTRs: 2+ bilaterally.    Babinski: Negative bilaterally.    Co-ordination: Normal finger-to-nose, heel to shin B/L.    Rhomberg: Negative.    Gait: Normal.    Cardiovascular: Regular rate and rhythm without murmur, gallop or rub.    Assessment and Plan:  1. Chronic bilateral low back pain with bilateral sciatica  2. Neuropathy  -Will increase gabapentin dose to 600 mg 3 times daily to help with increased symptoms of neuropathy and chronic low back pain.  I have advised her to restart the physical therapy as it remains the mainstay of treatment for her ongoing symptoms.  I will see her back in 3 months for follow-up.    - gabapentin (NEURONTIN) 600 MG tablet; Take 1 tablet by mouth 3 (Three) Times a Day for 30 days.  Dispense: 90 tablet; Refill: 3      I spent 25 minutes face to face with " the patient and spent more than 50% of this time  in management, instructions and education regarding above mentioned diagnosis and also on counseling and discussing about taking medication regularly, possible side effects with medication use, importance of good sleep hygiene, good hydration and regular exercise.    Return in about 6 months (around 12/25/2020).

## 2020-07-27 DIAGNOSIS — G62.9 NEUROPATHY: Primary | ICD-10-CM

## 2020-07-27 NOTE — TELEPHONE ENCOUNTER
gabapentin (NEURONTIN) 600 MG tablet; Take 1 tablet by mouth 3 (Three) Times a Day for 30 days.  Dispense: 90 tablet; Refill: 3       WILL ANOTHER PROVIDER FILL PLEASE ?    THANK YOU.

## 2020-07-28 RX ORDER — GABAPENTIN 600 MG/1
600 TABLET ORAL 3 TIMES DAILY
Qty: 90 TABLET | Refills: 0 | Status: SHIPPED | OUTPATIENT
Start: 2020-07-28 | End: 2020-09-25 | Stop reason: SDUPTHER

## 2020-09-25 ENCOUNTER — TELEPHONE (OUTPATIENT)
Dept: NEUROLOGY | Facility: CLINIC | Age: 45
End: 2020-09-25

## 2020-09-25 ENCOUNTER — TELEMEDICINE (OUTPATIENT)
Dept: NEUROLOGY | Facility: CLINIC | Age: 45
End: 2020-09-25

## 2020-09-25 DIAGNOSIS — M54.42 CHRONIC BILATERAL LOW BACK PAIN WITH BILATERAL SCIATICA: ICD-10-CM

## 2020-09-25 DIAGNOSIS — M54.41 CHRONIC BILATERAL LOW BACK PAIN WITH BILATERAL SCIATICA: ICD-10-CM

## 2020-09-25 DIAGNOSIS — R26.89 IMBALANCE: ICD-10-CM

## 2020-09-25 DIAGNOSIS — G62.9 NEUROPATHY: Primary | ICD-10-CM

## 2020-09-25 DIAGNOSIS — G89.29 CHRONIC BILATERAL LOW BACK PAIN WITH BILATERAL SCIATICA: ICD-10-CM

## 2020-09-25 PROCEDURE — 99214 OFFICE O/P EST MOD 30 MIN: CPT | Performed by: PSYCHIATRY & NEUROLOGY

## 2020-09-25 RX ORDER — GABAPENTIN 600 MG/1
600 TABLET ORAL 3 TIMES DAILY
Qty: 90 TABLET | Refills: 3 | Status: SHIPPED | OUTPATIENT
Start: 2020-09-25 | End: 2021-01-04

## 2020-09-25 NOTE — TELEPHONE ENCOUNTER
Patient was having issues with her video during her appt. Patient is needing to know if she needs to schedule a new appt or if a phone call would be fine.       please  [phone]

## 2020-09-25 NOTE — PROGRESS NOTES
Subjective:    CC: Tawana Christianson is followed for neuropathy and chronic low back pain.    HPI:      2/12/2020: She is in clinic for regular follow-up.  Since her last visit, she reports that she continues to have intermittent left hip pain, chronic low back pain.  She underwent MRI of the left hip and it was essentially unremarkable.  MRI of low back has been unremarkable as well.  She has not yet started physical therapy but will be scheduling it this week for both left hip and for the low back.  She continues to take gabapentin 400 mg 3 times a day and it seems to be working well for neuropathy.  She is reporting intermittent headache which has been going on for last 6 weeks.  It is of bifrontal in nature and mild to moderate in intensity.    6/25/2020: She is in clinic for regular follow-up.  She reports that her back pain and neuropathy symptoms are worse.  She had a fall few weeks ago and since then the back pain has become worse.  She did start physical therapy back in end of February but then it was closed with 2 pandemic.  She will be calling physical therapy soon to reschedule restart therapy.  The past, she has had MRI of the left hip and MRI of the lumbosacral spine which were both unremarkable.  She denies any side effects with gabapentin use  9/25/2020: This is a follow-up done through video.  Since her last visit 3 months ago, she reports her neuropathy symptoms and low back pain has improved with higher dose of gabapentin and 600 mg 3 times daily.  However, she has been having some problems with balance and gait.  She reports that she has almost had 3 instances where she was about to fall.  Luckily she did not sustain fall.    The following portions of the patient's history were reviewed and updated as of 09/25/2020: allergies, social history and problem list.       Current Outpatient Medications:   •  cetirizine (ZYRTEC ALLERGY) 10 MG tablet, Take  by mouth., Disp: , Rfl:   •  cholecalciferol  (VITAMIN D3) 1000 units tablet, Take 1,000 Units by mouth Daily., Disp: , Rfl:   •  FEROSUL 325 (65 Fe) MG tablet, Take 1 tablet by mouth 2 (Two) Times a Day., Disp: , Rfl:   •  furosemide (LASIX) 20 MG tablet, Take  by mouth Daily., Disp: , Rfl:   •  gabapentin (NEURONTIN) 600 MG tablet, Take 1 tablet by mouth 3 (Three) Times a Day for 30 days., Disp: 90 tablet, Rfl: 3  •  gabapentin (Neurontin) 600 MG tablet, Take 1 tablet by mouth 3 (Three) Times a Day., Disp: 90 tablet, Rfl: 3  •  ibuprofen (ADVIL,MOTRIN) 800 MG tablet, TAKE 1 TABLET BY MOUTH EVERY 8 HOURS AS NEEDED FOR MILD PAIN, Disp: 90 tablet, Rfl: 0  •  levothyroxine (SYNTHROID) 200 MCG tablet, Take 1 tablet by mouth Daily. (Patient taking differently: Take 224 mcg by mouth Daily.), Disp: 30 tablet, Rfl: 4  •  lisinopril-hydrochlorothiazide (PRINZIDE,ZESTORETIC) 20-12.5 MG per tablet, Take 1 tablet by mouth Daily., Disp: 30 tablet, Rfl: 3  •  meloxicam (MOBIC) 15 MG tablet, , Disp: , Rfl:   •  methylPREDNISolone (MEDROL, YENNIFER,) 4 MG tablet, Take as directed on package instructions., Disp: 1 each, Rfl: 0  •  methylPREDNISolone (MEDROL, YENNIFER,) 4 MG tablet, Take as directed on package instructions., Disp: 1 each, Rfl: 0  •  omeprazole (priLOSEC) 20 MG capsule, Take 20 mg by mouth Daily., Disp: , Rfl:   •  predniSONE (DELTASONE) 5 MG tablet, Take 5 mg by mouth Daily., Disp: , Rfl:    Past Medical History:   Diagnosis Date   • Chills    • Essential hypertension 2016   • Foot pain    • History of abnormal cervical Pap smear     normal not abnormal -    • History of Papanicolaou smear of cervix    • Hypertension    • Numbness    • Pain    • Paresthesia    • Plantar fasciitis    • Pulmonary nodule    • Right ankle pain    • Tingling    • Vertigo    • Viral infection    • Viral infection    • Vitamin D deficiency       Past Surgical History:   Procedure Laterality Date   •  SECTION      x3   • TONSILLECTOMY        Family History   Problem Relation Age  of Onset   • Hypertension Mother    • Arthritis Mother    • Heart disease Father    • Diabetes Father    • Hypertension Father    • Stroke Father    • Heart attack Father    • Arthritis Father    • Obesity Father    • Alzheimer's disease Maternal Grandmother    • Cancer Maternal Grandmother    • Cancer Maternal Grandfather    • Alzheimer's disease Maternal Grandfather         Review of Systems  Objective:    There were no vitals taken for this visit.    Neurology Exam:  General apperance: NAD.     Mental status: Alert, awake and oriented to time place and person.    Recent and Remote memory: Can recall 3/3 objects at 5 minutes. Can recall historical events.     Attention span and Concentration: Serial 7s: Normal.     Fund of knowledge:  Normal.     Language and Speech: No aphasia or dysarthria.    Naming , Repitition and Comprehension:  Can name objects, repeat a sentence and follow commands. Speech is clear and fluent with good repetition, comprehension, and naming.    Cranial Nerves:   Cranial nerves II to XII grossly intact.    Motor:  Moves all 4 extremities spontaneously and denies any weakness.    Sensory: Unable to assess.    DTRs: Unable to assess.    Babinski: Unable to assess.    Co-ordination: Normal finger-to-nose.    Rhomberg: Negative.    Gait: Normal.    Cardiovascular: Unable to assess.    Ophthalmoscopic exam: Unable to assess.    Assessment and Plan:  1. Neuropathy  2. Chronic bilateral low back pain with bilateral sciatica  3. Imbalance    -Symptoms of neuropathy and low back pain have improved after increasing the dose of gabapentin to 600 mg 3 times daily however she is complaining of balance and gait problems I will be referring her to balance and gait therapy for the same.  Continue with gabapentin at the same dose and I will do follow-up in next 3 months through video.    Unable to complete visit using a video connection to the patient. A phone visit was used to complete this visits. Total  time of discussion was 30  minutes.    - gabapentin (Neurontin) 600 MG tablet; Take 1 tablet by mouth 3 (Three) Times a Day.  Dispense: 90 tablet; Refill: 3    - Ambulatory Referral to Physical Therapy      Return in about 3 months (around 12/25/2020).

## 2021-01-04 ENCOUNTER — OFFICE VISIT (OUTPATIENT)
Dept: NEUROLOGY | Facility: CLINIC | Age: 46
End: 2021-01-04

## 2021-01-04 VITALS
HEART RATE: 90 BPM | OXYGEN SATURATION: 99 % | WEIGHT: 247 LBS | SYSTOLIC BLOOD PRESSURE: 116 MMHG | HEIGHT: 63 IN | TEMPERATURE: 97.7 F | DIASTOLIC BLOOD PRESSURE: 80 MMHG | BODY MASS INDEX: 43.77 KG/M2

## 2021-01-04 DIAGNOSIS — G89.29 CHRONIC BILATERAL LOW BACK PAIN WITH BILATERAL SCIATICA: ICD-10-CM

## 2021-01-04 DIAGNOSIS — M54.41 CHRONIC BILATERAL LOW BACK PAIN WITH BILATERAL SCIATICA: ICD-10-CM

## 2021-01-04 DIAGNOSIS — M54.42 CHRONIC BILATERAL LOW BACK PAIN WITH BILATERAL SCIATICA: ICD-10-CM

## 2021-01-04 DIAGNOSIS — G62.9 NEUROPATHY: ICD-10-CM

## 2021-01-04 PROCEDURE — 99214 OFFICE O/P EST MOD 30 MIN: CPT | Performed by: PSYCHIATRY & NEUROLOGY

## 2021-01-04 RX ORDER — GABAPENTIN 600 MG/1
600 TABLET ORAL 3 TIMES DAILY
Qty: 90 TABLET | Refills: 6 | Status: SHIPPED | OUTPATIENT
Start: 2021-01-04 | End: 2021-07-23

## 2021-01-04 RX ORDER — FLUOXETINE HYDROCHLORIDE 20 MG/1
CAPSULE ORAL
COMMUNITY
Start: 2020-11-16 | End: 2021-03-02

## 2021-01-04 NOTE — PROGRESS NOTES
Subjective:    CC: Tawana Christianson is in clinic today for follow up for      HPI:  2/12/2020: She is in clinic for regular follow-up.  Since her last visit, she reports that she continues to have intermittent left hip pain, chronic low back pain.  She underwent MRI of the left hip and it was essentially unremarkable.  MRI of low back has been unremarkable as well.  She has not yet started physical therapy but will be scheduling it this week for both left hip and for the low back.  She continues to take gabapentin 400 mg 3 times a day and it seems to be working well for neuropathy.  She is reporting intermittent headache which has been going on for last 6 weeks.  It is of bifrontal in nature and mild to moderate in intensity.    6/25/2020: She is in clinic for regular follow-up.  She reports that her back pain and neuropathy symptoms are worse.  She had a fall few weeks ago and since then the back pain has become worse.  She did start physical therapy back in end of February but then it was closed with 2 pandemic.  She will be calling physical therapy soon to reschedule restart therapy.  The past, she has had MRI of the left hip and MRI of the lumbosacral spine which were both unremarkable.  She denies any side effects with gabapentin use.    9/25/2020: This is a follow-up done through video.  Since her last visit 3 months ago, she reports her neuropathy symptoms and low back pain has improved with higher dose of gabapentin and 600 mg 3 times daily.  However, she has been having some problems with balance and gait.  She reports that she has almost had 3 instances where she was about to fall.  Luckily she did not sustain fall.    1/4/2021: She is in clinic for regular follow-up.  Since her last visit in September 2020, she reports that she has been doing balance and gait therapy and continues to take gabapentin 600 mg 3 times a day.  She reports that with physical therapy, symptoms seems to have improved and also  gabapentin seems to have helped.  She denies any side effects with gabapentin use.    The following portions of the patient's history were reviewed and updated as of 2021: allergies, social history and problem list.       Current Outpatient Medications:   •  cetirizine (ZYRTEC ALLERGY) 10 MG tablet, Take  by mouth., Disp: , Rfl:   •  cholecalciferol (VITAMIN D3) 1000 units tablet, Take 1,000 Units by mouth Daily., Disp: , Rfl:   •  FEROSUL 325 (65 Fe) MG tablet, Take 1 tablet by mouth 2 (Two) Times a Day., Disp: , Rfl:   •  FLUoxetine (PROzac) 20 MG capsule, TK 1 C PO QAM UTD, Disp: , Rfl:   •  gabapentin (NEURONTIN) 600 MG tablet, Take 1 tablet by mouth 3 (Three) Times a Day for 30 days., Disp: 90 tablet, Rfl: 3  •  ibuprofen (ADVIL,MOTRIN) 800 MG tablet, TAKE 1 TABLET BY MOUTH EVERY 8 HOURS AS NEEDED FOR MILD PAIN, Disp: 90 tablet, Rfl: 0  •  levothyroxine (SYNTHROID) 200 MCG tablet, Take 1 tablet by mouth Daily. (Patient taking differently: Take 224 mcg by mouth Daily.), Disp: 30 tablet, Rfl: 4  •  lisinopril-hydrochlorothiazide (PRINZIDE,ZESTORETIC) 20-12.5 MG per tablet, Take 1 tablet by mouth Daily., Disp: 30 tablet, Rfl: 3  •  omeprazole (priLOSEC) 20 MG capsule, Take 20 mg by mouth Daily., Disp: , Rfl:    Past Medical History:   Diagnosis Date   • Chills    • Essential hypertension 2016   • Foot pain    • History of abnormal cervical Pap smear     normal not abnormal -    • History of Papanicolaou smear of cervix    • Hypertension    • Numbness    • Pain    • Paresthesia    • Plantar fasciitis    • Pulmonary nodule    • Right ankle pain    • Tingling    • Vertigo    • Viral infection    • Viral infection    • Vitamin D deficiency       Past Surgical History:   Procedure Laterality Date   •  SECTION      x3   • TONSILLECTOMY        Family History   Problem Relation Age of Onset   • Hypertension Mother    • Arthritis Mother    • Heart disease Father    • Diabetes Father    • Hypertension  "Father    • Stroke Father    • Heart attack Father    • Arthritis Father    • Obesity Father    • Alzheimer's disease Maternal Grandmother    • Cancer Maternal Grandmother    • Cancer Maternal Grandfather    • Alzheimer's disease Maternal Grandfather         Review of Systems   Constitutional: Positive for appetite change and unexpected weight loss.   HENT: Negative.    Eyes: Negative.    Respiratory: Negative.    Cardiovascular: Negative.    Gastrointestinal: Negative.    Endocrine: Negative.    Genitourinary: Negative.    Musculoskeletal: Negative.    Skin: Negative.    Allergic/Immunologic: Negative.    Neurological: Positive for headache.   Hematological: Negative.    Psychiatric/Behavioral: Positive for sleep disturbance. The patient is nervous/anxious.      Objective:    /80   Pulse 90   Temp 97.7 °F (36.5 °C)   Ht 160 cm (62.99\")   Wt 112 kg (247 lb)   SpO2 99%   BMI 43.77 kg/m²     Neurology Exam:  General apperance: NAD.     Mental status: Alert, awake and oriented to time place and person.    Recent and Remote memory: Can recall 3/3 objects at 5 minutes. Can recall historical events.     Attention span and Concentration: Serial 7s: Normal.     Fund of knowledge:  Normal.     Language and Speech: No aphasia or dysarthria.    Naming , Repitition and Comprehension:  Can name objects, repeat a sentence and follow commands. Speech is clear and fluent with good repetition, comprehension, and naming.    CN II to XII: Intact.    Opthalmoscopic Exam: No papilledema.    Motor:  Right UE muscle strength 5/5. Normal tone.     Left UE muscle strength 5/5. Normal tone.      Right LE muscle strength5/5. Normal tone.     Left LE muscle strength 5/5. Normal tone.      Sensory: Normal light touch, vibration and pinprick sensation bilaterally.    DTRs: 2+ bilaterally.    Babinski: Negative bilaterally.    Co-ordination: Normal finger-to-nose, heel to shin B/L.    Rhomberg: Negative.    Gait: " Normal.    Cardiovascular: Regular rate and rhythm without murmur, gallop or rub.    Assessment and Plan:  1. Chronic bilateral low back pain with bilateral sciatica  2. Neuropathy  -She has done better with gait and balance therapy as well as gabapentin 600 mg 3 times daily.  She is currently in physical therapy and I have advised her to continue with exercises at home when she completes physical therapy for continued benefit.  Continue with gabapentin 600 mg 3 times daily and I will see her back in clinic in 6 months for follow-up.  - gabapentin (NEURONTIN) 600 MG tablet; Take 1 tablet by mouth 3 (Three) Times a Day for 30 days.  Dispense: 90 tablet; Refill: 6    I spent 25 minutes face to face with the patient and spent more than 50% of this time  in management, instructions and education regarding above mentioned diagnosis and also on counseling and discussing about taking medication regularly, possible side effects with medication use, importance of good sleep hygiene, good hydration and regular exercise.    Return in about 6 months (around 7/4/2021).

## 2021-03-02 ENCOUNTER — OFFICE VISIT (OUTPATIENT)
Dept: NEUROLOGY | Facility: CLINIC | Age: 46
End: 2021-03-02

## 2021-03-02 VITALS
HEIGHT: 63 IN | DIASTOLIC BLOOD PRESSURE: 78 MMHG | TEMPERATURE: 97.3 F | OXYGEN SATURATION: 98 % | HEART RATE: 78 BPM | SYSTOLIC BLOOD PRESSURE: 116 MMHG | BODY MASS INDEX: 43.77 KG/M2

## 2021-03-02 DIAGNOSIS — M54.41 CHRONIC BILATERAL LOW BACK PAIN WITH BILATERAL SCIATICA: Primary | ICD-10-CM

## 2021-03-02 DIAGNOSIS — G89.29 CHRONIC BILATERAL LOW BACK PAIN WITH BILATERAL SCIATICA: Primary | ICD-10-CM

## 2021-03-02 DIAGNOSIS — M54.42 CHRONIC BILATERAL LOW BACK PAIN WITH BILATERAL SCIATICA: Primary | ICD-10-CM

## 2021-03-02 DIAGNOSIS — R26.89 IMBALANCE: ICD-10-CM

## 2021-03-02 DIAGNOSIS — G62.9 NEUROPATHY: ICD-10-CM

## 2021-03-02 PROCEDURE — 99214 OFFICE O/P EST MOD 30 MIN: CPT | Performed by: PSYCHIATRY & NEUROLOGY

## 2021-03-02 RX ORDER — LORAZEPAM 0.5 MG/1
TABLET ORAL
COMMUNITY
Start: 2021-02-25

## 2021-03-02 RX ORDER — HYDROXYZINE PAMOATE 25 MG/1
25 CAPSULE ORAL NIGHTLY
COMMUNITY
Start: 2021-02-25

## 2021-03-02 RX ORDER — LEVOTHYROXINE SODIUM 112 UG/1
224 TABLET ORAL DAILY
COMMUNITY
Start: 2021-02-15

## 2021-03-02 RX ORDER — FLUOXETINE HYDROCHLORIDE 40 MG/1
40 CAPSULE ORAL DAILY
COMMUNITY
Start: 2021-02-25

## 2021-03-02 RX ORDER — UBIDECARENONE 75 MG
50 CAPSULE ORAL DAILY
COMMUNITY

## 2021-03-02 NOTE — PROGRESS NOTES
Subjective:    CC: Tawana Christianson is in clinic today for follow up for       HPI:  2/12/2020: She is in clinic for regular follow-up.  Since her last visit, she reports that she continues to have intermittent left hip pain, chronic low back pain.  She underwent MRI of the left hip and it was essentially unremarkable.  MRI of low back has been unremarkable as well.  She has not yet started physical therapy but will be scheduling it this week for both left hip and for the low back.  She continues to take gabapentin 400 mg 3 times a day and it seems to be working well for neuropathy.  She is reporting intermittent headache which has been going on for last 6 weeks.  It is of bifrontal in nature and mild to moderate in intensity.    6/25/2020: She is in clinic for regular follow-up.  She reports that her back pain and neuropathy symptoms are worse.  She had a fall few weeks ago and since then the back pain has become worse.  She did start physical therapy back in end of February but then it was closed with 2 pandemic.  She will be calling physical therapy soon to reschedule restart therapy.  The past, she has had MRI of the left hip and MRI of the lumbosacral spine which were both unremarkable.  She denies any side effects with gabapentin use.    9/25/2020: This is a follow-up done through video.  Since her last visit 3 months ago, she reports her neuropathy symptoms and low back pain has improved with higher dose of gabapentin and 600 mg 3 times daily.  However, she has been having some problems with balance and gait.  She reports that she has almost had 3 instances where she was about to fall.  Luckily she did not sustain fall.    1/4/2021: She is in clinic for regular follow-up.  Since her last visit in September 2020, she reports that she has been doing balance and gait therapy and continues to take gabapentin 600 mg 3 times a day.  She reports that with physical therapy, symptoms seems to have improved and also  gabapentin seems to have helped.  She denies any side effects with gabapentin use.    3/2/2021: She is in clinic for regular follow-up.  Since her last visit in January, she reports that she has now completed physical therapy and tells me that the therapist told me that there is nothing more that they can do to improve her ongoing symptoms.  She reports that with physical therapy, balance and improved but she has difficulty with balance whenever she closes her eyes.  She continues to take gabapentin 600 mg 3 times daily and it seems to have helped with low back pain.  She denies any side effects with gabapentin use.    The following portions of the patient's history were reviewed and updated as of 03/02/2021: allergies, social history and problem list.       Current Outpatient Medications:   •  cetirizine (ZYRTEC ALLERGY) 10 MG tablet, Take  by mouth., Disp: , Rfl:   •  cholecalciferol (VITAMIN D3) 1000 units tablet, Take 1,000 Units by mouth Daily., Disp: , Rfl:   •  FEROSUL 325 (65 Fe) MG tablet, Take 1 tablet by mouth 2 (Two) Times a Day., Disp: , Rfl:   •  FLUoxetine (PROzac) 40 MG capsule, Take 40 mg by mouth Daily., Disp: , Rfl:   •  hydrOXYzine pamoate (VISTARIL) 25 MG capsule, Take 25 mg by mouth Every Night., Disp: , Rfl:   •  ibuprofen (ADVIL,MOTRIN) 800 MG tablet, TAKE 1 TABLET BY MOUTH EVERY 8 HOURS AS NEEDED FOR MILD PAIN, Disp: 90 tablet, Rfl: 0  •  levothyroxine (SYNTHROID, LEVOTHROID) 112 MCG tablet, Take 224 mcg by mouth Daily., Disp: , Rfl:   •  lisinopril-hydrochlorothiazide (PRINZIDE,ZESTORETIC) 20-12.5 MG per tablet, Take 1 tablet by mouth Daily., Disp: 30 tablet, Rfl: 3  •  LORazepam (ATIVAN) 0.5 MG tablet, TAKE 1/2 TO 1 TABLET BY MOUTH ONCE DAILY AS NEEDED FOR ANXIETY, Disp: , Rfl:   •  omeprazole (priLOSEC) 20 MG capsule, Take 20 mg by mouth Daily., Disp: , Rfl:   •  vitamin B-12 (CYANOCOBALAMIN) 100 MCG tablet, Take 50 mcg by mouth Daily., Disp: , Rfl:   •  gabapentin (NEURONTIN) 600 MG  "tablet, Take 1 tablet by mouth 3 (Three) Times a Day for 30 days., Disp: 90 tablet, Rfl: 6   Past Medical History:   Diagnosis Date   • Chills    • Essential hypertension 2016   • Foot pain    • History of abnormal cervical Pap smear     normal not abnormal -    • History of Papanicolaou smear of cervix    • Hypertension    • Numbness    • Pain    • Paresthesia    • Plantar fasciitis    • Pulmonary nodule    • Right ankle pain    • Tingling    • Vertigo    • Viral infection    • Viral infection    • Vitamin D deficiency       Past Surgical History:   Procedure Laterality Date   •  SECTION      x3   • TONSILLECTOMY        Family History   Problem Relation Age of Onset   • Hypertension Mother    • Arthritis Mother    • Heart disease Father    • Diabetes Father    • Hypertension Father    • Stroke Father    • Heart attack Father    • Arthritis Father    • Obesity Father    • Alzheimer's disease Maternal Grandmother    • Cancer Maternal Grandmother    • Cancer Maternal Grandfather    • Alzheimer's disease Maternal Grandfather         Review of Systems   Constitutional: Negative.    HENT: Negative.    Eyes: Negative.    Respiratory: Negative.    Cardiovascular: Negative.    Gastrointestinal: Negative.    Endocrine: Negative.    Genitourinary: Negative.    Musculoskeletal: Negative.    Skin: Negative.    Allergic/Immunologic: Negative.    Neurological: Positive for numbness.   Hematological: Negative.    Psychiatric/Behavioral: Negative.      Objective:    /78   Pulse 78   Temp 97.3 °F (36.3 °C)   Ht 160 cm (62.99\")   SpO2 98%   BMI 43.77 kg/m²     Neurology Exam:  General apperance: NAD.     Mental status: Alert, awake and oriented to time place and person.    Recent and Remote memory: Can recall 3/3 objects at 5 minutes. Can recall historical events.     Attention span and Concentration: Serial 7s: Normal.     Fund of knowledge:  Normal.     Language and Speech: No aphasia or " dysarthria.    Naming , Repitition and Comprehension:  Can name objects, repeat a sentence and follow commands. Speech is clear and fluent with good repetition, comprehension, and naming.    CN II to XII: Intact.    Opthalmoscopic Exam: No papilledema.    Motor:  Right UE muscle strength 5/5. Normal tone.     Left UE muscle strength 5/5. Normal tone.      Right LE muscle strength5/5. Normal tone.     Left LE muscle strength 5/5. Normal tone.      Sensory: Normal light touch, vibration and pinprick sensation bilaterally.    DTRs: 2+ bilaterally.    Babinski: Negative bilaterally.    Co-ordination: Normal finger-to-nose, heel to shin B/L.    Rhomberg: Positive.    Gait: Normal.    Cardiovascular: Regular rate and rhythm without murmur, gallop or rub.    Assessment and Plan:  1. Chronic bilateral low back pain with bilateral sciatica  2. Neuropathy  3. Imbalance  -I have advised her to continue with exercises at home to avoid further decline in balance and also low back pain.  Since gabapentin has helped in keeping pain under control, continue with 600 mg 3 times daily dose and I will see her back in 6 months for follow-up.     I spent 25 minutes face to face with the patient and spent more than 50% of this time  in management, instructions and education regarding above mentioned diagnosis and also on counseling and discussing about taking medication regularly, possible side effects with medication use, importance of good sleep hygiene, good hydration and regular exercise.    Return in about 6 months (around 9/2/2021).

## 2021-04-18 DIAGNOSIS — G62.9 NEUROPATHY: ICD-10-CM

## 2021-04-18 DIAGNOSIS — M54.41 CHRONIC BILATERAL LOW BACK PAIN WITH BILATERAL SCIATICA: ICD-10-CM

## 2021-04-18 DIAGNOSIS — M54.42 CHRONIC BILATERAL LOW BACK PAIN WITH BILATERAL SCIATICA: ICD-10-CM

## 2021-04-18 DIAGNOSIS — G89.29 CHRONIC BILATERAL LOW BACK PAIN WITH BILATERAL SCIATICA: ICD-10-CM

## 2021-04-19 RX ORDER — GABAPENTIN 600 MG/1
TABLET ORAL
Qty: 90 TABLET | OUTPATIENT
Start: 2021-04-19

## 2021-07-23 DIAGNOSIS — G62.9 NEUROPATHY: ICD-10-CM

## 2021-07-23 DIAGNOSIS — M54.41 CHRONIC BILATERAL LOW BACK PAIN WITH BILATERAL SCIATICA: ICD-10-CM

## 2021-07-23 DIAGNOSIS — M54.42 CHRONIC BILATERAL LOW BACK PAIN WITH BILATERAL SCIATICA: ICD-10-CM

## 2021-07-23 DIAGNOSIS — G89.29 CHRONIC BILATERAL LOW BACK PAIN WITH BILATERAL SCIATICA: ICD-10-CM

## 2021-07-23 RX ORDER — GABAPENTIN 600 MG/1
TABLET ORAL
Qty: 90 TABLET | Refills: 3 | Status: SHIPPED | OUTPATIENT
Start: 2021-07-23 | End: 2021-11-22 | Stop reason: SDUPTHER

## 2021-11-22 ENCOUNTER — OFFICE VISIT (OUTPATIENT)
Dept: NEUROLOGY | Facility: CLINIC | Age: 46
End: 2021-11-22

## 2021-11-22 VITALS — BODY MASS INDEX: 43.05 KG/M2 | OXYGEN SATURATION: 96 % | HEART RATE: 73 BPM | HEIGHT: 63 IN | WEIGHT: 243 LBS

## 2021-11-22 DIAGNOSIS — M54.42 CHRONIC BILATERAL LOW BACK PAIN WITH BILATERAL SCIATICA: Primary | ICD-10-CM

## 2021-11-22 DIAGNOSIS — G62.9 NEUROPATHY: ICD-10-CM

## 2021-11-22 DIAGNOSIS — M54.41 CHRONIC BILATERAL LOW BACK PAIN WITH BILATERAL SCIATICA: Primary | ICD-10-CM

## 2021-11-22 DIAGNOSIS — G89.29 CHRONIC BILATERAL LOW BACK PAIN WITH BILATERAL SCIATICA: Primary | ICD-10-CM

## 2021-11-22 PROCEDURE — 99214 OFFICE O/P EST MOD 30 MIN: CPT | Performed by: PSYCHIATRY & NEUROLOGY

## 2021-11-22 RX ORDER — GABAPENTIN 600 MG/1
600 TABLET ORAL 3 TIMES DAILY
Qty: 90 TABLET | Refills: 5 | Status: SHIPPED | OUTPATIENT
Start: 2021-11-22 | End: 2022-05-24 | Stop reason: SDUPTHER

## 2021-11-22 NOTE — PROGRESS NOTES
Subjective:    CC: Tawana Christianson is in clinic today for follow up for history of idiopathic neuropathy and chronic low back pain.    HPI:  2/12/2020: She is in clinic for regular follow-up.  Since her last visit, she reports that she continues to have intermittent left hip pain, chronic low back pain.  She underwent MRI of the left hip and it was essentially unremarkable.  MRI of low back has been unremarkable as well.  She has not yet started physical therapy but will be scheduling it this week for both left hip and for the low back.  She continues to take gabapentin 400 mg 3 times a day and it seems to be working well for neuropathy.  She is reporting intermittent headache which has been going on for last 6 weeks.  It is of bifrontal in nature and mild to moderate in intensity.    6/25/2020: She is in clinic for regular follow-up.  She reports that her back pain and neuropathy symptoms are worse.  She had a fall few weeks ago and since then the back pain has become worse.  She did start physical therapy back in end of February but then it was closed with 2 pandemic.  She will be calling physical therapy soon to reschedule restart therapy.  The past, she has had MRI of the left hip and MRI of the lumbosacral spine which were both unremarkable.  She denies any side effects with gabapentin use.    9/25/2020: This is a follow-up done through video.  Since her last visit 3 months ago, she reports her neuropathy symptoms and low back pain has improved with higher dose of gabapentin and 600 mg 3 times daily.  However, she has been having some problems with balance and gait.  She reports that she has almost had 3 instances where she was about to fall.  Luckily she did not sustain fall.    1/4/2021: She is in clinic for regular follow-up.  Since her last visit in September 2020, she reports that she has been doing balance and gait therapy and continues to take gabapentin 600 mg 3 times a day.  She reports that with  physical therapy, symptoms seems to have improved and also gabapentin seems to have helped.  She denies any side effects with gabapentin use.    3/2/2021: She is in clinic for regular follow-up.  Since her last visit in January, she reports that she has now completed physical therapy and tells me that the therapist told me that there is nothing more that they can do to improve her ongoing symptoms.  She reports that with physical therapy, balance and improved but she has difficulty with balance whenever she closes her eyes.  She continues to take gabapentin 600 mg 3 times daily and it seems to have helped with low back pain.  She denies any side effects with gabapentin use.    11/22/2021: She is in clinic for regular follow-up.  Since her last visit in March 2021, she reports that symptoms of neuropathy and low back pain remains under good control with use of Neurontin 600 mg 3 times daily.  She reports that some days symptoms seems to be worse when she has walked for longer duration of time and some time when she has stayed on her feet for longer duration.    The following portions of the patient's history were reviewed and updated as of 11/22/2021: allergies, social history and problem list.       Current Outpatient Medications:   •  cetirizine (ZYRTEC ALLERGY) 10 MG tablet, Take  by mouth., Disp: , Rfl:   •  cholecalciferol (VITAMIN D3) 1000 units tablet, Take 1,000 Units by mouth Daily., Disp: , Rfl:   •  FEROSUL 325 (65 Fe) MG tablet, Take 1 tablet by mouth 2 (Two) Times a Day., Disp: , Rfl:   •  FLUoxetine (PROzac) 40 MG capsule, Take 40 mg by mouth Daily., Disp: , Rfl:   •  gabapentin (NEURONTIN) 600 MG tablet, Take 1 tablet by mouth 3 (Three) Times a Day., Disp: 90 tablet, Rfl: 5  •  hydrOXYzine pamoate (VISTARIL) 25 MG capsule, Take 25 mg by mouth Every Night., Disp: , Rfl:   •  ibuprofen (ADVIL,MOTRIN) 800 MG tablet, TAKE 1 TABLET BY MOUTH EVERY 8 HOURS AS NEEDED FOR MILD PAIN, Disp: 90 tablet, Rfl: 0  •   levothyroxine (SYNTHROID, LEVOTHROID) 112 MCG tablet, Take 224 mcg by mouth Daily., Disp: , Rfl:   •  lisinopril-hydrochlorothiazide (PRINZIDE,ZESTORETIC) 20-12.5 MG per tablet, Take 1 tablet by mouth Daily., Disp: 30 tablet, Rfl: 3  •  LORazepam (ATIVAN) 0.5 MG tablet, TAKE 1/2 TO 1 TABLET BY MOUTH ONCE DAILY AS NEEDED FOR ANXIETY, Disp: , Rfl:   •  omeprazole (priLOSEC) 20 MG capsule, Take 20 mg by mouth Daily., Disp: , Rfl:   •  vitamin B-12 (CYANOCOBALAMIN) 100 MCG tablet, Take 50 mcg by mouth Daily., Disp: , Rfl:    Past Medical History:   Diagnosis Date   • Chills    • Essential hypertension 2016   • Foot pain    • History of abnormal cervical Pap smear     normal not abnormal -    • History of Papanicolaou smear of cervix    • Hypertension    • Numbness    • Pain    • Paresthesia    • Plantar fasciitis    • Pulmonary nodule    • Right ankle pain    • Tingling    • Vertigo    • Viral infection    • Viral infection    • Vitamin D deficiency       Past Surgical History:   Procedure Laterality Date   •  SECTION      x3   • TONSILLECTOMY  1995      Family History   Problem Relation Age of Onset   • Hypertension Mother    • Arthritis Mother    • Heart disease Father    • Diabetes Father    • Hypertension Father    • Stroke Father    • Heart attack Father    • Arthritis Father    • Obesity Father    • Alzheimer's disease Maternal Grandmother    • Cancer Maternal Grandmother    • Cancer Maternal Grandfather    • Alzheimer's disease Maternal Grandfather         Review of Systems   Constitutional: Negative.    HENT: Negative.    Eyes: Negative.    Respiratory: Negative.    Cardiovascular: Negative.    Gastrointestinal: Negative.    Endocrine: Negative.    Genitourinary: Negative.    Musculoskeletal: Positive for gait problem.   Skin: Negative.    Allergic/Immunologic: Negative.    Neurological: Positive for numbness.   Hematological: Negative.    Psychiatric/Behavioral: Negative.      Objective:    Pulse  "73   Ht 160 cm (63\")   Wt 110 kg (243 lb)   SpO2 96%   BMI 43.05 kg/m²     Neurology Exam:  General apperance: NAD.     Mental status: Alert, awake and oriented to time place and person.    Recent and Remote memory: Can recall 3/3 objects at 5 minutes. Can recall historical events.     Attention span and Concentration: Serial 7s: Normal.     Fund of knowledge:  Normal.     Language and Speech: No aphasia or dysarthria.    Naming , Repitition and Comprehension:  Can name objects, repeat a sentence and follow commands. Speech is clear and fluent with good repetition, comprehension, and naming.    CN II to XII: Intact.    Opthalmoscopic Exam: No papilledema.    Motor:  Right UE muscle strength 5/5. Normal tone.     Left UE muscle strength 5/5. Normal tone.      Right LE muscle strength5/5. Normal tone.     Left LE muscle strength 5/5. Normal tone.      Sensory: Normal light touch, vibration and pinprick sensation bilaterally.    DTRs: 2+ bilaterally.    Babinski: Negative bilaterally.    Co-ordination: Normal finger-to-nose, heel to shin B/L.    Rhomberg: Negative.    Gait: Normal.    Cardiovascular: Regular rate and rhythm without murmur, gallop or rub.    Assessment and Plan:  1. Chronic bilateral low back pain with bilateral sciatica  2. Neuropathy  Overall, both low back pain and neuropathy symptoms remain under good control with use of gabapentin 600 mg 3 times daily straight to be continued at the same dose.  In case if, symptoms become worse, I have advised her to call office otherwise, I will see her back in 6 months for follow-up.    - gabapentin (NEURONTIN) 600 MG tablet; Take 1 tablet by mouth 3 (Three) Times a Day.  Dispense: 90 tablet; Refill: 5     I spent 30 minutes face to face with the patient and spent more than 50% of this time  in management, instructions and education regarding above mentioned diagnosis and also on counseling and discussing about taking medication regularly, possible side " effects with medication use, importance of good sleep hygiene, good hydration and regular exercise.    Return in about 6 months (around 5/22/2022).

## 2022-05-24 ENCOUNTER — OFFICE VISIT (OUTPATIENT)
Dept: NEUROLOGY | Facility: CLINIC | Age: 47
End: 2022-05-24

## 2022-05-24 VITALS
BODY MASS INDEX: 43.15 KG/M2 | HEART RATE: 78 BPM | DIASTOLIC BLOOD PRESSURE: 82 MMHG | SYSTOLIC BLOOD PRESSURE: 132 MMHG | OXYGEN SATURATION: 94 % | WEIGHT: 243.6 LBS

## 2022-05-24 DIAGNOSIS — M54.42 CHRONIC BILATERAL LOW BACK PAIN WITH BILATERAL SCIATICA: Primary | ICD-10-CM

## 2022-05-24 DIAGNOSIS — M54.41 CHRONIC BILATERAL LOW BACK PAIN WITH BILATERAL SCIATICA: Primary | ICD-10-CM

## 2022-05-24 DIAGNOSIS — G89.29 CHRONIC BILATERAL LOW BACK PAIN WITH BILATERAL SCIATICA: Primary | ICD-10-CM

## 2022-05-24 DIAGNOSIS — G62.9 NEUROPATHY: ICD-10-CM

## 2022-05-24 PROCEDURE — 99214 OFFICE O/P EST MOD 30 MIN: CPT | Performed by: PSYCHIATRY & NEUROLOGY

## 2022-05-24 RX ORDER — GABAPENTIN 600 MG/1
900 TABLET ORAL 3 TIMES DAILY
Qty: 135 TABLET | Refills: 5 | Status: SHIPPED | OUTPATIENT
Start: 2022-05-24 | End: 2022-12-15

## 2022-05-24 NOTE — PROGRESS NOTES
Subjective:    CC: Tawana Christianson is in clinic today for follow up for chronic low back pain and neuropathy.    HPI:  2/12/2020: She is in clinic for regular follow-up.  Since her last visit, she reports that she continues to have intermittent left hip pain, chronic low back pain.  She underwent MRI of the left hip and it was essentially unremarkable.  MRI of low back has been unremarkable as well.  She has not yet started physical therapy but will be scheduling it this week for both left hip and for the low back.  She continues to take gabapentin 400 mg 3 times a day and it seems to be working well for neuropathy.  She is reporting intermittent headache which has been going on for last 6 weeks.  It is of bifrontal in nature and mild to moderate in intensity.    6/25/2020: She is in clinic for regular follow-up.  She reports that her back pain and neuropathy symptoms are worse.  She had a fall few weeks ago and since then the back pain has become worse.  She did start physical therapy back in end of February but then it was closed with 2 pandemic.  She will be calling physical therapy soon to reschedule restart therapy.  The past, she has had MRI of the left hip and MRI of the lumbosacral spine which were both unremarkable.  She denies any side effects with gabapentin use.    9/25/2020: This is a follow-up done through video.  Since her last visit 3 months ago, she reports her neuropathy symptoms and low back pain has improved with higher dose of gabapentin and 600 mg 3 times daily.  However, she has been having some problems with balance and gait.  She reports that she has almost had 3 instances where she was about to fall.  Luckily she did not sustain fall.    1/4/2021: She is in clinic for regular follow-up.  Since her last visit in September 2020, she reports that she has been doing balance and gait therapy and continues to take gabapentin 600 mg 3 times a day.  She reports that with physical therapy, symptoms  seems to have improved and also gabapentin seems to have helped.  She denies any side effects with gabapentin use.    3/2/2021: She is in clinic for regular follow-up.  Since her last visit in January, she reports that she has now completed physical therapy and tells me that the therapist told me that there is nothing more that they can do to improve her ongoing symptoms.  She reports that with physical therapy, balance and improved but she has difficulty with balance whenever she closes her eyes.  She continues to take gabapentin 600 mg 3 times daily and it seems to have helped with low back pain.  She denies any side effects with gabapentin use.    11/22/2021: She is in clinic for regular follow-up.  Since her last visit in March 2021, she reports that symptoms of neuropathy and low back pain remains under good control with use of Neurontin 600 mg 3 times daily.  She reports that some days symptoms seems to be worse when she has walked for longer duration of time and some time when she has stayed on her feet for longer duration.    5/24/2022: She is in clinic for regular follow-up.  Since her last visit in November 2021, she reports that symptoms of neuropathy and low back pain are under good control for the most part but there are days when she gets intense muscle spasms, twitching in the muscles of both the feet and feeling of muscle squeezing.  She denies any side effects with gabapentin use.    The following portions of the patient's history were reviewed and updated as of 05/24/2022: allergies, social history and problem list.       Current Outpatient Medications:   •  cetirizine (zyrTEC) 10 MG tablet, Take  by mouth., Disp: , Rfl:   •  cholecalciferol (VITAMIN D3) 1000 units tablet, Take 1,000 Units by mouth Daily., Disp: , Rfl:   •  FEROSUL 325 (65 Fe) MG tablet, Take 1 tablet by mouth 2 (Two) Times a Day., Disp: , Rfl:   •  FLUoxetine (PROzac) 40 MG capsule, Take 40 mg by mouth Daily., Disp: , Rfl:   •   gabapentin (NEURONTIN) 600 MG tablet, Take 1.5 tablets by mouth 3 (Three) Times a Day for 30 days., Disp: 135 tablet, Rfl: 5  •  hydrOXYzine pamoate (VISTARIL) 25 MG capsule, Take 25 mg by mouth Every Night., Disp: , Rfl:   •  ibuprofen (ADVIL,MOTRIN) 800 MG tablet, TAKE 1 TABLET BY MOUTH EVERY 8 HOURS AS NEEDED FOR MILD PAIN, Disp: 90 tablet, Rfl: 0  •  levothyroxine (SYNTHROID, LEVOTHROID) 112 MCG tablet, Take 224 mcg by mouth Daily., Disp: , Rfl:   •  lisinopril-hydrochlorothiazide (PRINZIDE,ZESTORETIC) 20-12.5 MG per tablet, Take 1 tablet by mouth Daily., Disp: 30 tablet, Rfl: 3  •  LORazepam (ATIVAN) 0.5 MG tablet, TAKE 1/2 TO 1 TABLET BY MOUTH ONCE DAILY AS NEEDED FOR ANXIETY, Disp: , Rfl:   •  omeprazole (priLOSEC) 20 MG capsule, Take 20 mg by mouth Daily., Disp: , Rfl:   •  vitamin B-12 (CYANOCOBALAMIN) 100 MCG tablet, Take 50 mcg by mouth Daily., Disp: , Rfl:    Past Medical History:   Diagnosis Date   • Chills    • Essential hypertension 2016   • Foot pain    • History of abnormal cervical Pap smear     normal not abnormal -    • History of Papanicolaou smear of cervix    • Hypertension    • Numbness    • Pain    • Paresthesia    • Plantar fasciitis    • Pulmonary nodule    • Right ankle pain    • Tingling    • Vertigo    • Viral infection    • Viral infection    • Vitamin D deficiency       Past Surgical History:   Procedure Laterality Date   •  SECTION      x3   • TONSILLECTOMY        Family History   Problem Relation Age of Onset   • Hypertension Mother    • Arthritis Mother    • Heart disease Father    • Diabetes Father    • Hypertension Father    • Stroke Father    • Heart attack Father    • Arthritis Father    • Obesity Father    • Alzheimer's disease Maternal Grandmother    • Cancer Maternal Grandmother    • Cancer Maternal Grandfather    • Alzheimer's disease Maternal Grandfather         Review of Systems  Objective:    /82   Pulse 78   Wt 110 kg (243 lb 9.6 oz)   SpO2 94%    BMI 43.15 kg/m²     Neurology Exam:  General apperance: NAD.     Mental status: Alert, awake and oriented to time place and person.    Recent and Remote memory: Can recall 3/3 objects at 5 minutes. Can recall historical events.     Attention span and Concentration: Serial 7s: Normal.     Fund of knowledge:  Normal.     Language and Speech: No aphasia or dysarthria.    Naming , Repitition and Comprehension:  Can name objects, repeat a sentence and follow commands. Speech is clear and fluent with good repetition, comprehension, and naming.    CN II to XII: Intact.    Opthalmoscopic Exam: No papilledema.    Motor:  Right UE muscle strength 5/5. Normal tone.     Left UE muscle strength 5/5. Normal tone.      Right LE muscle strength5/5. Normal tone.     Left LE muscle strength 5/5. Normal tone.      Sensory: Normal light touch, vibration and pinprick sensation bilaterally.    DTRs: 2+ bilaterally.    Babinski: Negative bilaterally.    Co-ordination: Normal finger-to-nose, heel to shin B/L.    Rhomberg: Negative.    Gait: Normal.    Cardiovascular: Regular rate and rhythm without murmur, gallop or rub.    Assessment and Plan:  1. Chronic bilateral low back pain with bilateral sciatica  2. Neuropathy  -Overall symptoms are under good control however some days, she is reporting intense muscle spasms/twitching and squeezing type of feeling in both her feet.  Since she does not have any side effects with gabapentin use, I will increase gabapentin dose to 900 mg 3 times daily to help with the symptoms.  Based on the response, further dose adjustment will be made.  I will plan to see her back in clinic in 6 months for follow-up.      - gabapentin (NEURONTIN) 600 MG tablet; Take 1.5 tablets by mouth 3 (Three) Times a Day for 30 days.  Dispense: 135 tablet; Refill: 5       I spent 30 minutes in patient care: Reviewing records prior to the visit, entering orders and documentation and spent more than marin 50% of this time  face-to-face in management, instructions and education regarding above mentioned diagnosis and also on counseling and discussing about taking medication regularly, possible side effects with medication use, importance of good sleep hygiene, good hydration and regular exercise.    Return in about 6 months (around 11/24/2022).

## 2022-08-26 ENCOUNTER — LAB (OUTPATIENT)
Dept: LAB | Facility: HOSPITAL | Age: 47
End: 2022-08-26

## 2022-08-26 ENCOUNTER — TRANSCRIBE ORDERS (OUTPATIENT)
Dept: LAB | Facility: HOSPITAL | Age: 47
End: 2022-08-26

## 2022-08-26 DIAGNOSIS — M13.0 POLYARTHROPATHY: Primary | ICD-10-CM

## 2022-08-26 DIAGNOSIS — M13.0 POLYARTHROPATHY: ICD-10-CM

## 2022-08-26 LAB
CK SERPL-CCNC: 51 U/L (ref 20–180)
CRP SERPL-MCNC: 0.81 MG/DL (ref 0–0.5)
ERYTHROCYTE [SEDIMENTATION RATE] IN BLOOD: 11 MM/HR (ref 0–20)
URATE SERPL-MCNC: 4.8 MG/DL (ref 2.4–5.7)

## 2022-08-26 PROCEDURE — 82550 ASSAY OF CK (CPK): CPT

## 2022-08-26 PROCEDURE — 36415 COLL VENOUS BLD VENIPUNCTURE: CPT

## 2022-08-26 PROCEDURE — 84550 ASSAY OF BLOOD/URIC ACID: CPT

## 2022-08-26 PROCEDURE — 85652 RBC SED RATE AUTOMATED: CPT

## 2022-08-26 PROCEDURE — 86140 C-REACTIVE PROTEIN: CPT

## 2022-09-09 ENCOUNTER — LAB (OUTPATIENT)
Dept: LAB | Facility: HOSPITAL | Age: 47
End: 2022-09-09

## 2022-12-15 DIAGNOSIS — M54.41 CHRONIC BILATERAL LOW BACK PAIN WITH BILATERAL SCIATICA: ICD-10-CM

## 2022-12-15 DIAGNOSIS — G62.9 NEUROPATHY: ICD-10-CM

## 2022-12-15 DIAGNOSIS — M54.42 CHRONIC BILATERAL LOW BACK PAIN WITH BILATERAL SCIATICA: ICD-10-CM

## 2022-12-15 DIAGNOSIS — G89.29 CHRONIC BILATERAL LOW BACK PAIN WITH BILATERAL SCIATICA: ICD-10-CM

## 2022-12-15 NOTE — TELEPHONE ENCOUNTER
Rx Refill Note  Requested Prescriptions     Pending Prescriptions Disp Refills   • gabapentin (NEURONTIN) 600 MG tablet [Pharmacy Med Name: GABAPENTIN 600 MG TABLET] 135 tablet      Sig: TAKE ONE AND A HALF TABLETS BY MOUTH THREE TIMES A DAY      Last filled: 5/24/22 with 5 refills pending to provider  Last office visit with prescribing clinician: 5/24/2022      Next office visit with prescribing clinician: 2/14/2023     Tawana Boggs MA  12/15/22, 11:09 EST

## 2022-12-19 RX ORDER — GABAPENTIN 600 MG/1
TABLET ORAL
Qty: 135 TABLET | Refills: 2 | Status: SHIPPED | OUTPATIENT
Start: 2022-12-19

## 2023-06-14 DIAGNOSIS — G62.9 NEUROPATHY: ICD-10-CM

## 2023-06-14 DIAGNOSIS — M54.42 CHRONIC BILATERAL LOW BACK PAIN WITH BILATERAL SCIATICA: ICD-10-CM

## 2023-06-14 DIAGNOSIS — G89.29 CHRONIC BILATERAL LOW BACK PAIN WITH BILATERAL SCIATICA: ICD-10-CM

## 2023-06-14 DIAGNOSIS — M54.41 CHRONIC BILATERAL LOW BACK PAIN WITH BILATERAL SCIATICA: ICD-10-CM

## 2023-06-14 RX ORDER — GABAPENTIN 600 MG/1
TABLET ORAL
Qty: 135 TABLET | Refills: 2 | Status: SHIPPED | OUTPATIENT
Start: 2023-06-14

## 2023-06-14 NOTE — TELEPHONE ENCOUNTER
Rx Refill Note  Requested Prescriptions     Pending Prescriptions Disp Refills    gabapentin (NEURONTIN) 600 MG tablet 135 tablet 2     Sig: TAKE ONE AND A HALF TABLETS BY MOUTH THREE TIMES A DAY      Last filled:12/19/23. Pending to provider  Last office visit with prescribing clinician: 5/24/2022      Next office visit with prescribing clinician: 9/22/2023     James Handley MA  06/14/23, 10:29 EDT

## 2023-06-14 NOTE — TELEPHONE ENCOUNTER
Caller: Tawana Christianson    Relationship: Self    Best call back number: [phone]    Requested Prescriptions:   Requested Prescriptions     Pending Prescriptions Disp Refills    gabapentin (NEURONTIN) 600 MG tablet 135 tablet 2        Pharmacy where request should be sent:    [facility]    Last office visit with prescribing clinician: 5/24/2022   Last telemedicine visit with prescribing clinician: Visit date not found   Next office visit with prescribing clinician: 9/22/2023     Additional details provided by patient: PT TAKES 1 AND 1/2 TABLETS 3X DAY    Does the patient have less than a 3 day supply:  [x] Yes  [] No    Would you like a call back once the refill request has been completed: [] Yes [] No    If the office needs to give you a call back, can they leave a voicemail: [] Yes [] No    Rosenda Carver Rep   06/14/23 08:48 EDT

## 2023-09-22 ENCOUNTER — OFFICE VISIT (OUTPATIENT)
Dept: NEUROLOGY | Facility: CLINIC | Age: 48
End: 2023-09-22
Payer: MEDICAID

## 2023-09-22 VITALS
HEART RATE: 81 BPM | BODY MASS INDEX: 47.84 KG/M2 | SYSTOLIC BLOOD PRESSURE: 124 MMHG | OXYGEN SATURATION: 94 % | DIASTOLIC BLOOD PRESSURE: 84 MMHG | WEIGHT: 270 LBS | HEIGHT: 63 IN

## 2023-09-22 DIAGNOSIS — G62.9 NEUROPATHY: Primary | ICD-10-CM

## 2023-09-22 DIAGNOSIS — M54.41 CHRONIC BILATERAL LOW BACK PAIN WITH BILATERAL SCIATICA: ICD-10-CM

## 2023-09-22 DIAGNOSIS — G89.29 CHRONIC BILATERAL LOW BACK PAIN WITH BILATERAL SCIATICA: ICD-10-CM

## 2023-09-22 DIAGNOSIS — M54.42 CHRONIC BILATERAL LOW BACK PAIN WITH BILATERAL SCIATICA: ICD-10-CM

## 2023-09-22 PROBLEM — F32.A ANXIETY AND DEPRESSION: Chronic | Status: ACTIVE | Noted: 2022-05-29

## 2023-09-22 PROBLEM — F32.2 MAJOR DEPRESSIVE DISORDER, SINGLE EPISODE, SEVERE WITHOUT PSYCHOTIC FEATURES: Status: ACTIVE | Noted: 2022-06-21

## 2023-09-22 PROBLEM — M25.579 ANKLE PAIN: Status: ACTIVE | Noted: 2023-09-22

## 2023-09-22 PROBLEM — B34.9 VIRAL INFECTION: Status: ACTIVE | Noted: 2023-09-22

## 2023-09-22 PROBLEM — M72.2 PLANTAR FASCIITIS: Status: ACTIVE | Noted: 2023-09-22

## 2023-09-22 PROBLEM — K65.1 POSTOPERATIVE INTRA-ABDOMINAL ABSCESS: Status: ACTIVE | Noted: 2022-06-02

## 2023-09-22 PROBLEM — R20.0 NUMBNESS: Status: ACTIVE | Noted: 2023-09-22

## 2023-09-22 PROBLEM — R20.2 TINGLING OF SKIN: Status: ACTIVE | Noted: 2023-09-22

## 2023-09-22 PROBLEM — M79.673 FOOT PAIN: Status: ACTIVE | Noted: 2023-09-22

## 2023-09-22 PROBLEM — T81.43XA POSTOPERATIVE INTRA-ABDOMINAL ABSCESS: Status: ACTIVE | Noted: 2022-06-02

## 2023-09-22 PROBLEM — E66.01 CLASS 3 SEVERE OBESITY WITH BODY MASS INDEX (BMI) OF 40.0 TO 44.9 IN ADULT: Chronic | Status: ACTIVE | Noted: 2022-05-29

## 2023-09-22 PROBLEM — F33.9 MAJOR DEPRESSION, RECURRENT: Status: ACTIVE | Noted: 2022-07-11

## 2023-09-22 PROBLEM — K37 APPENDICITIS: Status: ACTIVE | Noted: 2023-07-12

## 2023-09-22 PROBLEM — E66.813 CLASS 3 SEVERE OBESITY WITH BODY MASS INDEX (BMI) OF 40.0 TO 44.9 IN ADULT: Chronic | Status: ACTIVE | Noted: 2022-05-29

## 2023-09-22 PROBLEM — F41.9 ANXIETY AND DEPRESSION: Chronic | Status: ACTIVE | Noted: 2022-05-29

## 2023-09-22 PROBLEM — R68.83 CHILLS: Status: ACTIVE | Noted: 2023-09-22

## 2023-09-22 PROBLEM — E87.6 HYPOKALEMIA: Status: ACTIVE | Noted: 2022-05-29

## 2023-09-22 PROBLEM — Z72.0 TOBACCO USE: Status: ACTIVE | Noted: 2022-06-02

## 2023-09-22 PROCEDURE — 3074F SYST BP LT 130 MM HG: CPT | Performed by: PSYCHIATRY & NEUROLOGY

## 2023-09-22 PROCEDURE — 1160F RVW MEDS BY RX/DR IN RCRD: CPT | Performed by: PSYCHIATRY & NEUROLOGY

## 2023-09-22 PROCEDURE — 99214 OFFICE O/P EST MOD 30 MIN: CPT | Performed by: PSYCHIATRY & NEUROLOGY

## 2023-09-22 PROCEDURE — 1159F MED LIST DOCD IN RCRD: CPT | Performed by: PSYCHIATRY & NEUROLOGY

## 2023-09-22 PROCEDURE — 3079F DIAST BP 80-89 MM HG: CPT | Performed by: PSYCHIATRY & NEUROLOGY

## 2023-09-22 RX ORDER — ROSUVASTATIN CALCIUM 10 MG/1
TABLET, COATED ORAL
COMMUNITY
Start: 2023-08-29

## 2023-09-22 RX ORDER — QUETIAPINE FUMARATE 25 MG/1
TABLET, FILM COATED ORAL
COMMUNITY
Start: 2023-06-18

## 2023-09-22 RX ORDER — ACETAMINOPHEN 325 MG/1
650 TABLET ORAL
COMMUNITY

## 2023-09-22 RX ORDER — PROCHLORPERAZINE MALEATE 5 MG/1
TABLET ORAL
COMMUNITY
Start: 2023-08-07

## 2023-09-22 RX ORDER — GABAPENTIN 600 MG/1
TABLET ORAL
Qty: 135 TABLET | Refills: 5 | Status: SHIPPED | OUTPATIENT
Start: 2023-09-22

## 2023-09-22 RX ORDER — DULOXETIN HYDROCHLORIDE 60 MG/1
CAPSULE, DELAYED RELEASE ORAL
COMMUNITY
Start: 2023-06-18

## 2023-09-22 RX ORDER — LEVOTHYROXINE SODIUM 0.2 MG/1
200 TABLET ORAL DAILY
COMMUNITY
Start: 2023-09-01

## 2023-09-22 RX ORDER — TRAZODONE HYDROCHLORIDE 100 MG/1
50-100 TABLET ORAL
COMMUNITY

## 2023-09-22 RX ORDER — PRAZOSIN HYDROCHLORIDE 2 MG/1
2 CAPSULE ORAL
COMMUNITY

## 2023-09-22 RX ORDER — CELECOXIB 200 MG/1
CAPSULE ORAL
COMMUNITY
Start: 2023-08-21

## 2023-09-22 NOTE — PROGRESS NOTES
Subjective:    CC: Tawana Christianson is in clinic today for follow up for history of neuropathy and lumbar radiculopathy.    HPI:  2/12/2020: She is in clinic for regular follow-up.  Since her last visit, she reports that she continues to have intermittent left hip pain, chronic low back pain.  She underwent MRI of the left hip and it was essentially unremarkable.  MRI of low back has been unremarkable as well.  She has not yet started physical therapy but will be scheduling it this week for both left hip and for the low back.  She continues to take gabapentin 400 mg 3 times a day and it seems to be working well for neuropathy.  She is reporting intermittent headache which has been going on for last 6 weeks.  It is of bifrontal in nature and mild to moderate in intensity.    6/25/2020: She is in clinic for regular follow-up.  She reports that her back pain and neuropathy symptoms are worse.  She had a fall few weeks ago and since then the back pain has become worse.  She did start physical therapy back in end of February but then it was closed with 2 pandemic.  She will be calling physical therapy soon to reschedule restart therapy.  The past, she has had MRI of the left hip and MRI of the lumbosacral spine which were both unremarkable.  She denies any side effects with gabapentin use.    9/25/2020: This is a follow-up done through video.  Since her last visit 3 months ago, she reports her neuropathy symptoms and low back pain has improved with higher dose of gabapentin and 600 mg 3 times daily.  However, she has been having some problems with balance and gait.  She reports that she has almost had 3 instances where she was about to fall.  Luckily she did not sustain fall.    1/4/2021: She is in clinic for regular follow-up.  Since her last visit in September 2020, she reports that she has been doing balance and gait therapy and continues to take gabapentin 600 mg 3 times a day.  She reports that with physical  therapy, symptoms seems to have improved and also gabapentin seems to have helped.  She denies any side effects with gabapentin use.    3/2/2021: She is in clinic for regular follow-up.  Since her last visit in January, she reports that she has now completed physical therapy and tells me that the therapist told me that there is nothing more that they can do to improve her ongoing symptoms.  She reports that with physical therapy, balance and improved but she has difficulty with balance whenever she closes her eyes.  She continues to take gabapentin 600 mg 3 times daily and it seems to have helped with low back pain.  She denies any side effects with gabapentin use.    11/22/2021: She is in clinic for regular follow-up.  Since her last visit in March 2021, she reports that symptoms of neuropathy and low back pain remains under good control with use of Neurontin 600 mg 3 times daily.  She reports that some days symptoms seems to be worse when she has walked for longer duration of time and some time when she has stayed on her feet for longer duration.    5/24/2022: She is in clinic for regular follow-up.  Since her last visit in November 2021, she reports that symptoms of neuropathy and low back pain are under good control for the most part but there are days when she gets intense muscle spasms, twitching in the muscles of both the feet and feeling of muscle squeezing.  She denies any side effects with gabapentin use.    9/22/2023: She is in clinic with regular follow-up.  Since her last visit in May 2022, she reports that after increasing the dose of gabapentin 900 mg 3 times daily, symptoms of back pain, neuropathy are better controlled.  She is tolerating higher dose of gabapentin well without any side effects.  She is also following up with pain management regularly and is getting lumbar epidural injections which seems to be helping for an average 4 to 6 months.    The following portions of the patient's history  were reviewed and updated as of 09/22/2023: allergies, social history, and problem list.       Current Outpatient Medications:     celecoxib (CeleBREX) 200 MG capsule, , Disp: , Rfl:     DULoxetine (CYMBALTA) 60 MG capsule, , Disp: , Rfl:     levothyroxine (SYNTHROID, LEVOTHROID) 200 MCG tablet, Take 1 tablet by mouth Daily., Disp: , Rfl:     prochlorperazine (COMPAZINE) 5 MG tablet, , Disp: , Rfl:     QUEtiapine (SEROquel) 25 MG tablet, , Disp: , Rfl:     rosuvastatin (CRESTOR) 10 MG tablet, , Disp: , Rfl:     acetaminophen (TYLENOL) 325 MG tablet, Take 2 tablets by mouth., Disp: , Rfl:     cetirizine (zyrTEC) 10 MG tablet, Take  by mouth., Disp: , Rfl:     cholecalciferol (VITAMIN D3) 1000 units tablet, Take 1,000 Units by mouth Daily., Disp: , Rfl:     FEROSUL 325 (65 Fe) MG tablet, Take 1 tablet by mouth 2 (Two) Times a Day., Disp: , Rfl:     FLUoxetine (PROzac) 40 MG capsule, Take 40 mg by mouth Daily., Disp: , Rfl:     gabapentin (NEURONTIN) 600 MG tablet, TAKE ONE AND A HALF TABLETS BY MOUTH THREE TIMES A DAY, Disp: 135 tablet, Rfl: 2    hydrOXYzine pamoate (VISTARIL) 25 MG capsule, Take 25 mg by mouth Every Night., Disp: , Rfl:     ibuprofen (ADVIL,MOTRIN) 800 MG tablet, TAKE 1 TABLET BY MOUTH EVERY 8 HOURS AS NEEDED FOR MILD PAIN, Disp: 90 tablet, Rfl: 0    lisinopril-hydrochlorothiazide (PRINZIDE,ZESTORETIC) 20-12.5 MG per tablet, Take 1 tablet by mouth Daily., Disp: 30 tablet, Rfl: 3    LORazepam (ATIVAN) 0.5 MG tablet, TAKE 1/2 TO 1 TABLET BY MOUTH ONCE DAILY AS NEEDED FOR ANXIETY, Disp: , Rfl:     omeprazole (priLOSEC) 20 MG capsule, Take 20 mg by mouth Daily., Disp: , Rfl:     prazosin (MINIPRESS) 2 MG capsule, Take 1 capsule by mouth., Disp: , Rfl:     traZODone (DESYREL) 100 MG tablet, Take 0.5-1 tablets by mouth., Disp: , Rfl:     vitamin B-12 (CYANOCOBALAMIN) 100 MCG tablet, Take 50 mcg by mouth Daily., Disp: , Rfl:    Past Medical History:   Diagnosis Date    Chills     Essential hypertension  "2016    Foot pain     History of abnormal cervical Pap smear     normal not abnormal -     History of Papanicolaou smear of cervix     Hypertension     Numbness     Pain     Paresthesia     Plantar fasciitis     Pulmonary nodule     Right ankle pain     Tingling     Vertigo     Viral infection     Viral infection     Vitamin D deficiency       Past Surgical History:   Procedure Laterality Date     SECTION      x3    TONSILLECTOMY  1995      Family History   Problem Relation Age of Onset    Hypertension Mother     Arthritis Mother     Heart disease Father     Diabetes Father     Hypertension Father     Stroke Father     Heart attack Father     Arthritis Father     Obesity Father     Alzheimer's disease Maternal Grandmother     Cancer Maternal Grandmother     Cancer Maternal Grandfather     Alzheimer's disease Maternal Grandfather         Review of Systems  Objective:    /84   Pulse 81   Ht 160 cm (63\") Comment: self reported  Wt 122 kg (270 lb) Comment: self reported  SpO2 94%   BMI 47.83 kg/m²     Neurology Exam:  General apperance: NAD.     Mental status: Alert, awake and oriented to time place and person.    Language and Speech: No aphasia or dysarthria.    CN II to XII: Intact.    Opthalmoscopic Exam: No papilledema.    Motor:  Right UE muscle strength 5/5. Normal tone.     Left UE muscle strength 5/5. Normal tone.      Right LE muscle strength 5/5. Normal tone.     Left LE muscle strength 5/5. Normal tone.      Sensory: Reduced light touch, vibration and pinprick sensation bilaterally.    DTRs: 1+ bilaterally.    Babinski: Negative bilaterally.    Co-ordination: Normal finger-to-nose, heel to shin B/L.    Rhomberg: Negative.    Gait: Normal.    Cardiovascular: Regular rate and rhythm without murmur, gallop or rub.    Assessment and Plan:  1. Neuropathy  2. Chronic bilateral low back pain with bilateral sciatica  Overall symptoms of neuropathy and low back pain are better controlled with " higher dose of gabapentin-900 mg 3 times daily.  She denies any side effects with gabapentin use it will be continued the same dose and I will see her back in clinic in 1 year for follow-up.       I spent 30 minutes in patient care: Reviewing records prior to the visit, entering orders and documentation and spent more than marin 50% of this time face-to-face in management, instructions and education regarding above mentioned diagnosis and also on counseling and discussing about taking medication regularly, possible side effects with medication use, importance of good sleep hygiene, good hydration and regular exercise.    Return in about 1 year (around 9/22/2024).       Note to patient: The 21st Century Cures Act makes medical notes like these available to patients in the interest of transparency. However, be advised this is a medical document. It is intended as peer to peer communication. It is written in medical language and may contain abbreviations or verbiage that are unfamiliar. It may appear blunt or direct. Medical documents are intended to carry relevant information, facts as evident, and the clinical opinion of the physician.

## 2024-04-18 ENCOUNTER — APPOINTMENT (OUTPATIENT)
Dept: GENERAL RADIOLOGY | Facility: HOSPITAL | Age: 49
End: 2024-04-18
Attending: EMERGENCY MEDICINE
Payer: COMMERCIAL

## 2024-04-18 ENCOUNTER — HOSPITAL ENCOUNTER (EMERGENCY)
Facility: HOSPITAL | Age: 49
Discharge: HOME | End: 2024-04-18
Attending: EMERGENCY MEDICINE
Payer: COMMERCIAL

## 2024-04-18 ENCOUNTER — APPOINTMENT (OUTPATIENT)
Dept: CT IMAGING | Facility: HOSPITAL | Age: 49
End: 2024-04-18
Attending: EMERGENCY MEDICINE
Payer: COMMERCIAL

## 2024-04-18 VITALS
BODY MASS INDEX: 47.84 KG/M2 | RESPIRATION RATE: 16 BRPM | HEART RATE: 103 BPM | TEMPERATURE: 98.3 F | OXYGEN SATURATION: 95 % | WEIGHT: 270 LBS | DIASTOLIC BLOOD PRESSURE: 98 MMHG | HEIGHT: 63 IN | SYSTOLIC BLOOD PRESSURE: 146 MMHG

## 2024-04-18 DIAGNOSIS — R03.0 ELEVATED BLOOD PRESSURE READING: ICD-10-CM

## 2024-04-18 DIAGNOSIS — M54.50 ACUTE LOW BACK PAIN WITHOUT SCIATICA, UNSPECIFIED BACK PAIN LATERALITY: ICD-10-CM

## 2024-04-18 DIAGNOSIS — R07.89 CHEST WALL PAIN: ICD-10-CM

## 2024-04-18 DIAGNOSIS — R51.9 NONINTRACTABLE HEADACHE, UNSPECIFIED CHRONICITY PATTERN, UNSPECIFIED HEADACHE TYPE: ICD-10-CM
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
## 2024-04-18 PROCEDURE — 71046 X-RAY EXAM CHEST 2 VIEWS: CPT

## 2024-04-18 PROCEDURE — 94761 N-INVAS EAR/PLS OXIMETRY MLT: CPT

## 2024-04-18 PROCEDURE — 6370000000 HC RX 637 (ALT 250 FOR IP): Performed by: EMERGENCY MEDICINE

## 2024-04-18 PROCEDURE — 70450 CT HEAD/BRAIN W/O DYE: CPT

## 2024-04-18 PROCEDURE — 72100 X-RAY EXAM L-S SPINE 2/3 VWS: CPT

## 2024-04-18 PROCEDURE — 99284 EMERGENCY DEPT VISIT MOD MDM: CPT

## 2024-04-18 RX ORDER — CYCLOBENZAPRINE HCL 10 MG
10 TABLET ORAL 3 TIMES DAILY PRN
Qty: 21 TABLET | Refills: 0 | Status: SHIPPED | OUTPATIENT
Start: 2024-04-18 | End: 2024-04-28

## 2024-04-18 RX ORDER — LEVOTHYROXINE SODIUM 0.05 MG/1
200 TABLET ORAL DAILY
COMMUNITY

## 2024-04-18 RX ORDER — HYDROCHLOROTHIAZIDE 25 MG/1
12.5 TABLET ORAL DAILY
COMMUNITY

## 2024-04-18 RX ORDER — GABAPENTIN 600 MG/1
900 TABLET ORAL 3 TIMES DAILY
COMMUNITY

## 2024-04-18 RX ORDER — ACETAMINOPHEN 325 MG/1
650 TABLET ORAL ONCE
Status: COMPLETED | OUTPATIENT
Start: 2024-04-18 | End: 2024-04-18

## 2024-04-18 RX ORDER — LISINOPRIL 20 MG/1
20 TABLET ORAL DAILY
COMMUNITY

## 2024-04-18 RX ORDER — METHOCARBAMOL 500 MG/1
1000 TABLET, FILM COATED ORAL ONCE
Status: COMPLETED | OUTPATIENT
Start: 2024-04-18 | End: 2024-04-18

## 2024-04-18 RX ADMIN — METHOCARBAMOL TABLETS 1000 MG: 500 TABLET, COATED ORAL at 20:08

## 2024-04-18 RX ADMIN — ACETAMINOPHEN 650 MG: 325 TABLET, FILM COATED ORAL at 20:08

## 2024-04-18 ASSESSMENT — PAIN DESCRIPTION - ORIENTATION: ORIENTATION: MID

## 2024-04-18 ASSESSMENT — PAIN DESCRIPTION - INTENSITY: RATING_2: 8

## 2024-04-18 ASSESSMENT — PAIN DESCRIPTION - LOCATION
LOCATION: CHEST
LOCATION_2: HEAD

## 2024-04-18 ASSESSMENT — PAIN - FUNCTIONAL ASSESSMENT
PAIN_FUNCTIONAL_ASSESSMENT: 0-10
PAIN_FUNCTIONAL_ASSESSMENT_SITE2: ACTIVITIES ARE NOT PREVENTED

## 2024-04-18 ASSESSMENT — PAIN DESCRIPTION - ONSET: ONSET: SUDDEN

## 2024-04-18 ASSESSMENT — PAIN DESCRIPTION - DESCRIPTORS
DESCRIPTORS_2: ACHING
DESCRIPTORS: ACHING

## 2024-04-18 ASSESSMENT — PAIN SCALES - GENERAL
PAINLEVEL_OUTOF10: 7
PAINLEVEL_OUTOF10: 7

## 2024-04-18 ASSESSMENT — PAIN DESCRIPTION - FREQUENCY: FREQUENCY: CONTINUOUS

## 2024-04-18 ASSESSMENT — PAIN DESCRIPTION - PAIN TYPE: TYPE: ACUTE PAIN
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
## 2024-04-24 DIAGNOSIS — G62.9 NEUROPATHY: ICD-10-CM

## 2024-04-24 DIAGNOSIS — G89.29 CHRONIC BILATERAL LOW BACK PAIN WITH BILATERAL SCIATICA: ICD-10-CM

## 2024-04-24 DIAGNOSIS — M54.41 CHRONIC BILATERAL LOW BACK PAIN WITH BILATERAL SCIATICA: ICD-10-CM

## 2024-04-24 DIAGNOSIS — M54.42 CHRONIC BILATERAL LOW BACK PAIN WITH BILATERAL SCIATICA: ICD-10-CM

## 2024-04-24 NOTE — TELEPHONE ENCOUNTER
Rx Refill Note  Requested Prescriptions     Pending Prescriptions Disp Refills    gabapentin (NEURONTIN) 600 MG tablet [Pharmacy Med Name: GABAPENTIN 600 MG TABLET] 135 tablet      Sig: TAKE 1 AND 1/2 TABLET BY MOUTH THREE TIMES A DAY      Last filled: 9/22/23 for 6 mos total    Last office visit with prescribing clinician: 9/22/2023      Next office visit with prescribing clinician: 9/23/2024     Leo Bowden MA  04/24/24, 16:03 EDT

## 2024-04-25 RX ORDER — GABAPENTIN 600 MG/1
TABLET ORAL
Qty: 135 TABLET | Refills: 3 | Status: SHIPPED | OUTPATIENT
Start: 2024-04-25

## 2024-09-23 ENCOUNTER — OFFICE VISIT (OUTPATIENT)
Dept: NEUROLOGY | Facility: CLINIC | Age: 49
End: 2024-09-23
Payer: MEDICAID

## 2024-09-23 VITALS — DIASTOLIC BLOOD PRESSURE: 78 MMHG | HEART RATE: 83 BPM | OXYGEN SATURATION: 96 % | SYSTOLIC BLOOD PRESSURE: 118 MMHG

## 2024-09-23 DIAGNOSIS — G62.9 NEUROPATHY: Primary | ICD-10-CM

## 2024-09-23 DIAGNOSIS — G89.29 CHRONIC BILATERAL LOW BACK PAIN WITH BILATERAL SCIATICA: ICD-10-CM

## 2024-09-23 DIAGNOSIS — M54.42 CHRONIC BILATERAL LOW BACK PAIN WITH BILATERAL SCIATICA: ICD-10-CM

## 2024-09-23 DIAGNOSIS — M54.41 CHRONIC BILATERAL LOW BACK PAIN WITH BILATERAL SCIATICA: ICD-10-CM

## 2024-09-23 PROCEDURE — 3074F SYST BP LT 130 MM HG: CPT | Performed by: PSYCHIATRY & NEUROLOGY

## 2024-09-23 PROCEDURE — 1159F MED LIST DOCD IN RCRD: CPT | Performed by: PSYCHIATRY & NEUROLOGY

## 2024-09-23 PROCEDURE — 99214 OFFICE O/P EST MOD 30 MIN: CPT | Performed by: PSYCHIATRY & NEUROLOGY

## 2024-09-23 PROCEDURE — 3078F DIAST BP <80 MM HG: CPT | Performed by: PSYCHIATRY & NEUROLOGY

## 2024-09-23 PROCEDURE — 1160F RVW MEDS BY RX/DR IN RCRD: CPT | Performed by: PSYCHIATRY & NEUROLOGY

## 2024-09-23 RX ORDER — CARIPRAZINE 1.5 MG/1
1.5 CAPSULE, GELATIN COATED ORAL DAILY
COMMUNITY
Start: 2024-08-24

## 2024-09-23 RX ORDER — GABAPENTIN 600 MG/1
TABLET ORAL
Qty: 135 TABLET | Refills: 6 | Status: SHIPPED | OUTPATIENT
Start: 2024-09-23

## 2024-09-23 RX ORDER — CEPHALEXIN 500 MG/1
500 CAPSULE ORAL
COMMUNITY
End: 2024-10-02

## 2024-09-23 RX ORDER — PANTOPRAZOLE SODIUM 20 MG/1
20 TABLET, DELAYED RELEASE ORAL DAILY
COMMUNITY

## 2024-10-17 ENCOUNTER — TELEPHONE (OUTPATIENT)
Dept: NEUROLOGY | Facility: OTHER | Age: 49
End: 2024-10-17
Payer: MEDICAID

## 2024-10-17 NOTE — TELEPHONE ENCOUNTER
PATIENT IS CALLING TO ADVISE, TAKING MAGNESIUM HAS HELPED BUT SHE IS STILL HAVING TWITCHING IN HANDS AND FEET.    PLEASE ADVISE PATIENT

## 2024-10-22 NOTE — TELEPHONE ENCOUNTER
"Relay     \"Returned call to patient, message stated caller not available, no option to lvm. Need to check what type of magnesium she takes (ex. Magnesium oxide, glycinate) and what strength\"                "

## 2024-11-08 ENCOUNTER — TELEPHONE (OUTPATIENT)
Dept: NEUROLOGY | Facility: CLINIC | Age: 49
End: 2024-11-08
Payer: MEDICAID

## 2024-11-08 NOTE — TELEPHONE ENCOUNTER
Caller: Tawana Christianson    Phone Number:   Telephone Information:   Mobile [phone]     Reason for Call: PT WAS CALLING TO CHECK ON THE STATUS OF THE RESPONSE FROM ENCOUNTER DATED 10-17-24 - HAS BEEN CLOSED OUT SO A NEW ENCOUNTER WAS MADE. I SEEN THIS RESPONSE IN LAST ENCOUNTER. I ASKED PT IS THE TWITCHING IS WORSE AFTER STOPPING MEDICATION ? SHE STATES IT IS NOT WORSE AND CURRENTLY THE SAME AS BEFORE.       November 5, 2024  Leo Bowden MA to Reinaldo Zelaya MD     11/5/24  3:34 PM  Please see calls from patient. Her next appt is 9/23/25, do I need to schedule something sooner?  Reinaldo Zelaya MD to Leo Bowden MA     11/5/24  3:49 PM  Since stopping magnesium, is muscle twitching worse?

## 2024-11-17 DIAGNOSIS — G89.29 CHRONIC BILATERAL LOW BACK PAIN WITH BILATERAL SCIATICA: ICD-10-CM

## 2024-11-17 DIAGNOSIS — M54.41 CHRONIC BILATERAL LOW BACK PAIN WITH BILATERAL SCIATICA: ICD-10-CM

## 2024-11-17 DIAGNOSIS — M54.42 CHRONIC BILATERAL LOW BACK PAIN WITH BILATERAL SCIATICA: ICD-10-CM

## 2024-11-17 DIAGNOSIS — G62.9 NEUROPATHY: ICD-10-CM

## 2024-11-18 RX ORDER — GABAPENTIN 600 MG/1
TABLET ORAL
Qty: 135 TABLET | OUTPATIENT
Start: 2024-11-18

## 2024-11-18 NOTE — TELEPHONE ENCOUNTER
Sent to [facility] 9/23/24 for 6 mos total of med. Pharmacy can get transferred if she needs to  elsewhere.
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
## 2024-12-27 DIAGNOSIS — G62.9 NEUROPATHY: ICD-10-CM

## 2024-12-27 DIAGNOSIS — M54.42 CHRONIC BILATERAL LOW BACK PAIN WITH BILATERAL SCIATICA: ICD-10-CM

## 2024-12-27 DIAGNOSIS — M54.41 CHRONIC BILATERAL LOW BACK PAIN WITH BILATERAL SCIATICA: ICD-10-CM

## 2024-12-27 DIAGNOSIS — G89.29 CHRONIC BILATERAL LOW BACK PAIN WITH BILATERAL SCIATICA: ICD-10-CM

## 2024-12-27 RX ORDER — GABAPENTIN 600 MG/1
TABLET ORAL
Qty: 135 TABLET | OUTPATIENT
Start: 2024-12-27

## 2025-01-21 ENCOUNTER — OFFICE VISIT (OUTPATIENT)
Dept: CARDIOLOGY | Facility: CLINIC | Age: 50
End: 2025-01-21
Payer: MEDICAID

## 2025-01-21 ENCOUNTER — TELEPHONE (OUTPATIENT)
Dept: CARDIOLOGY | Facility: CLINIC | Age: 50
End: 2025-01-21

## 2025-01-21 VITALS
OXYGEN SATURATION: 96 % | SYSTOLIC BLOOD PRESSURE: 128 MMHG | DIASTOLIC BLOOD PRESSURE: 84 MMHG | HEIGHT: 63 IN | BODY MASS INDEX: 51.91 KG/M2 | WEIGHT: 293 LBS | HEART RATE: 83 BPM

## 2025-01-21 DIAGNOSIS — G47.33 OSA (OBSTRUCTIVE SLEEP APNEA): Primary | ICD-10-CM

## 2025-01-21 DIAGNOSIS — G47.59 OTHER PARASOMNIA: ICD-10-CM

## 2025-01-21 DIAGNOSIS — G47.10 HYPERSOMNIA: ICD-10-CM

## 2025-01-21 PROBLEM — F51.4 NIGHT TERRORS, ADULT: Status: ACTIVE | Noted: 2025-01-21

## 2025-01-21 PROCEDURE — 99204 OFFICE O/P NEW MOD 45 MIN: CPT | Performed by: NURSE PRACTITIONER

## 2025-01-21 PROCEDURE — 3074F SYST BP LT 130 MM HG: CPT | Performed by: NURSE PRACTITIONER

## 2025-01-21 PROCEDURE — 3079F DIAST BP 80-89 MM HG: CPT | Performed by: NURSE PRACTITIONER

## 2025-01-21 RX ORDER — NITROFURANTOIN 25; 75 MG/1; MG/1
100 CAPSULE ORAL
COMMUNITY
Start: 2025-01-18 | End: 2025-01-23

## 2025-01-21 RX ORDER — PROPRANOLOL HCL 20 MG
20 TABLET ORAL
COMMUNITY
Start: 2024-10-31

## 2025-01-21 RX ORDER — LISINOPRIL 20 MG/1
20 TABLET ORAL
COMMUNITY
Start: 2025-01-10

## 2025-01-21 RX ORDER — QUETIAPINE FUMARATE 50 MG/1
50 TABLET, FILM COATED ORAL
COMMUNITY
Start: 2024-11-15

## 2025-01-21 RX ORDER — POTASSIUM CHLORIDE 750 MG/1
10 TABLET, EXTENDED RELEASE ORAL
COMMUNITY
Start: 2024-12-01

## 2025-01-21 RX ORDER — METFORMIN HYDROCHLORIDE 500 MG/1
500 TABLET, EXTENDED RELEASE ORAL
COMMUNITY
Start: 2025-01-20

## 2025-01-21 RX ORDER — FUROSEMIDE 20 MG/1
20 TABLET ORAL
COMMUNITY
Start: 2024-11-06

## 2025-01-21 RX ORDER — BUSPIRONE HYDROCHLORIDE 10 MG/1
10 TABLET ORAL
COMMUNITY
Start: 2025-01-06
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
## 2025-01-21 NOTE — ASSESSMENT & PLAN NOTE
Patient reports worsening daytime sleepiness and fatigue.  She reports a 10 pound weight gain in the past year.

## 2025-01-21 NOTE — ASSESSMENT & PLAN NOTE
On Prazosin follows with neurology.  Patient reports sleep walking and talking.  She states she has visual hallucinations and has had episode of paralysis when falling to sleep or waking.

## 2025-01-21 NOTE — TELEPHONE ENCOUNTER
Attempted to call patient to let her know that Matilda is ordering her a titration. No answer and mailbox was full.
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
## 2025-02-25 DIAGNOSIS — G47.33 OSA (OBSTRUCTIVE SLEEP APNEA): ICD-10-CM

## 2025-02-25 DIAGNOSIS — G47.10 HYPERSOMNIA: ICD-10-CM

## 2025-02-27 ENCOUNTER — TELEPHONE (OUTPATIENT)
Dept: CARDIOLOGY | Facility: CLINIC | Age: 50
End: 2025-02-27
Payer: MEDICAID

## 2025-02-27 DIAGNOSIS — G47.33 OSA (OBSTRUCTIVE SLEEP APNEA): Primary | ICD-10-CM

## 2025-02-27 NOTE — TELEPHONE ENCOUNTER
----- Message from Matilda Bran sent at 2/25/2025  9:13 AM EST -----  Please call the patient and let her know I just got her titration study.  Patient has titrated to Bipap 16/10 cm.  She had moderate oxygen desaturations that corrected on the Bipap.  I think she uses [employer], let me know if this is correct.  I will need to order her a new pap device.  ----- Message -----  From: Jackie, Taryn Incoming  Sent: 2/25/2025   8:58 AM EST  To: LEVAR Ríos

## 2025-03-12 ENCOUNTER — TELEPHONE (OUTPATIENT)
Dept: CARDIOLOGY | Facility: CLINIC | Age: 50
End: 2025-03-12
Payer: MEDICAID

## 2025-03-12 NOTE — TELEPHONE ENCOUNTER
Called patient to move 3/17/25 appointment out. Set up on new machine 3/6/25 so need to be moved out 31-90 days from that date. Voicemail full, unable to leave a message.   HUB OK TO RELAY

## 2025-04-05 DIAGNOSIS — G62.9 NEUROPATHY: ICD-10-CM

## 2025-04-05 DIAGNOSIS — G89.29 CHRONIC BILATERAL LOW BACK PAIN WITH BILATERAL SCIATICA: ICD-10-CM

## 2025-04-05 DIAGNOSIS — M54.41 CHRONIC BILATERAL LOW BACK PAIN WITH BILATERAL SCIATICA: ICD-10-CM

## 2025-04-05 DIAGNOSIS — M54.42 CHRONIC BILATERAL LOW BACK PAIN WITH BILATERAL SCIATICA: ICD-10-CM

## 2025-04-07 NOTE — TELEPHONE ENCOUNTER
Rx Refill Note  Requested Prescriptions     Pending Prescriptions Disp Refills    gabapentin (NEURONTIN) 600 MG tablet [Pharmacy Med Name: GABAPENTIN 600MG TABLETS] 135 tablet      Sig: TAKE 1 AND 1/2 TABLETS BY MOUTH THREE TIMES DAILY      Last filled: 9/23/24 for 6 mos total    Last office visit with prescribing clinician: 9/23/2024      Next office visit with prescribing clinician: 9/23/2025     Leo Bowden MA  04/07/25, 13:10 EDT

## 2025-04-08 RX ORDER — GABAPENTIN 600 MG/1
TABLET ORAL
Qty: 135 TABLET | Refills: 0 | Status: SHIPPED | OUTPATIENT
Start: 2025-04-08

## 2025-04-08 NOTE — TELEPHONE ENCOUNTER
I have reviewed PDMP along with Dr. Zelaya's last OV note from 9/23/2024, she is scheduled to see him next on 9/23/2025. Refill of Gabapentin 900 mg TID has been sent, thank you.

## 2025-04-08 NOTE — TELEPHONE ENCOUNTER
Spoke with Tawana and she is taking the 600mg  1.5 tablets TID which totals 900mg three times a day.

## 2025-04-22 ENCOUNTER — OFFICE VISIT (OUTPATIENT)
Dept: CARDIOLOGY | Facility: CLINIC | Age: 50
End: 2025-04-22
Payer: MEDICAID

## 2025-04-22 VITALS
HEIGHT: 63 IN | HEART RATE: 76 BPM | BODY MASS INDEX: 51.91 KG/M2 | OXYGEN SATURATION: 98 % | WEIGHT: 293 LBS | DIASTOLIC BLOOD PRESSURE: 88 MMHG | SYSTOLIC BLOOD PRESSURE: 136 MMHG

## 2025-04-22 DIAGNOSIS — G47.33 OSA (OBSTRUCTIVE SLEEP APNEA): Primary | ICD-10-CM

## 2025-04-22 PROCEDURE — 1159F MED LIST DOCD IN RCRD: CPT | Performed by: NURSE PRACTITIONER

## 2025-04-22 PROCEDURE — 3075F SYST BP GE 130 - 139MM HG: CPT | Performed by: NURSE PRACTITIONER

## 2025-04-22 PROCEDURE — 99213 OFFICE O/P EST LOW 20 MIN: CPT | Performed by: NURSE PRACTITIONER

## 2025-04-22 PROCEDURE — 1160F RVW MEDS BY RX/DR IN RCRD: CPT | Performed by: NURSE PRACTITIONER

## 2025-04-22 PROCEDURE — 3079F DIAST BP 80-89 MM HG: CPT | Performed by: NURSE PRACTITIONER

## 2025-04-22 RX ORDER — ASPIRIN 81 MG
81 TABLET,CHEWABLE ORAL DAILY
COMMUNITY
Start: 2025-02-05

## 2025-04-22 RX ORDER — VALSARTAN AND HYDROCHLOROTHIAZIDE 320; 12.5 MG/1; MG/1
1 TABLET, FILM COATED ORAL DAILY
COMMUNITY
Start: 2025-03-13

## 2025-04-22 NOTE — PROGRESS NOTES
Follow-Up Sleep Consult     Date:   2025  Name: Tawana Christianson  :   1975  PCP: Carol Curiel APRN    Chief Complaint   Patient presents with    Sleep Apnea     31-90 day compliance       Subjective     History of Present Illness  Tawana Christianson is a 49 y.o. female who presents today for follow-up on RUBY.    Patient states she feels like she is doing well with her BiPAP therapy.  She reports that her sleep is more rested.  She states that her daytime sleepiness and fatigue has improved.  She reports that she does have fibromyalgia which causes her to be fatigued during the day.  Mask fit and airflow are comfortable.    RUBY history:    PSG 2022 baseline AHI 32 that increased to 66 and REM.    Titration 25 Titrated to Bipap 16/10 PS 4     SM [employer]  Air-touch F20    Current mask used is FFM    Device Functioning Well: Yes  Mask Fit Comfortable: Yes  Air Flow Comfortable: Yes  DME Helpful for Supplies: Yes  Sleep is rested: Yes        Device Download:                       The patient's relevant past medical, surgical, family, and social history reviewed and updated in Epic as appropriate.    Past Medical History:   Diagnosis Date    Chills     Essential hypertension 2016    Fibromyalgia     Foot pain     History of abnormal cervical Pap smear     normal not abnormal -     History of Papanicolaou smear of cervix     Hypertension     Numbness     Pain     Paresthesia     Plantar fasciitis     Pulmonary nodule     Right ankle pain     Tingling     Vertigo     Viral infection     Viral infection     Vitamin D deficiency      Past Surgical History:   Procedure Laterality Date     SECTION      x3    TONSILLECTOMY       OB History    No obstetric history on file.       Allergies   Allergen Reactions    Diclofenac Swelling    Voltaren [Diclofenac Sodium] Swelling     Prior to Admission medications    Medication Sig Start Date End Date Taking? Authorizing Provider    Aspirin Low Dose 81 MG chewable tablet Chew 1 tablet Daily. 2/5/25  Yes Ena Stone MD   valsartan-hydrochlorothiazide (DIOVAN-HCT) 320-12.5 MG per tablet Take 1 tablet by mouth Daily. 3/13/25  Yes Ena Stone MD   acetaminophen (TYLENOL) 325 MG tablet Take 2 tablets by mouth.    Ena Stone MD   busPIRone (BUSPAR) 10 MG tablet Take 1 tablet by mouth. 1/6/25   Ena Stone MD   celecoxib (CeleBREX) 200 MG capsule Take 1 capsule by mouth Daily. 8/21/23   Ena Stone MD   cetirizine (zyrTEC) 10 MG tablet Take  by mouth. 2/20/15   Ena Stone MD   cholecalciferol (VITAMIN D3) 1000 units tablet Take 1 tablet by mouth Daily.    Ena Stone MD   DULoxetine (CYMBALTA) 60 MG capsule Take 1 capsule by mouth Daily. 6/18/23   Ena Stone MD   FLUoxetine (PROzac) 40 MG capsule Take 1 capsule by mouth Daily. 2/25/21   Ena Stone MD   furosemide (LASIX) 20 MG tablet 1 tablet. 11/6/24   Ena Stone MD   gabapentin (NEURONTIN) 600 MG tablet TAKE 1 AND 1/2 TABLETS BY MOUTH THREE TIMES DAILY 4/8/25   Jasmin Banks APRN   hydrOXYzine pamoate (VISTARIL) 25 MG capsule Take 1 capsule by mouth Every Night. 2/25/21   Ena Stone MD   levothyroxine (SYNTHROID, LEVOTHROID) 200 MCG tablet Take 1 tablet by mouth Daily. 9/1/23   Ena Stone MD   lisinopril (PRINIVIL,ZESTRIL) 20 MG tablet 1 tablet. 1/10/25   Ena Stone MD   lisinopril-hydrochlorothiazide (PRINZIDE,ZESTORETIC) 20-12.5 MG per tablet Take 1 tablet by mouth Daily. 2/26/18   Ngozi Lozada APRN   LORazepam (ATIVAN) 0.5 MG tablet TAKE 1/2 TO 1 TABLET BY MOUTH ONCE DAILY AS NEEDED FOR ANXIETY 2/25/21   Ena Stone MD   metFORMIN ER (GLUCOPHAGE-XR) 500 MG 24 hr tablet 1 tablet. 1/20/25   Ena Stone MD   pantoprazole (PROTONIX) 20 MG EC tablet Take 1 tablet by mouth Daily.    Provider, MD Ena   potassium chloride 10 MEQ  "CR tablet 1 tablet. 12/1/24   Ena Stone MD   prazosin (MINIPRESS) 2 MG capsule Take 1 capsule by mouth Every Night.    Ena Stone MD   propranolol (INDERAL) 20 MG tablet Take 1 tablet by mouth. 10/31/24   Ena Stone MD   QUEtiapine (SEROquel) 50 MG tablet Take 1 tablet by mouth. 11/15/24   Ena Stone MD   rosuvastatin (CRESTOR) 10 MG tablet Take 1 tablet by mouth Daily. 8/29/23   Ena Stone MD   vitamin B-12 (CYANOCOBALAMIN) 1000 MCG tablet Take 1 tablet by mouth Daily.    Ena Stone MD   Vraylar 1.5 MG capsule capsule Take 1 capsule by mouth Daily.  Patient not taking: Reported on 4/22/2025 8/24/24 4/22/25  Ena Stone MD     Family History   Problem Relation Age of Onset    Hypertension Mother     Arthritis Mother     Heart disease Father     Diabetes Father     Hypertension Father     Stroke Father     Heart attack Father     Arthritis Father     Obesity Father     Alzheimer's disease Maternal Grandmother     Cancer Maternal Grandmother     Cancer Maternal Grandfather     Alzheimer's disease Maternal Grandfather        Objective     Vital Signs:  /88 (BP Location: Right arm, Patient Position: Sitting, Cuff Size: Large Adult)   Pulse 76   Ht 160 cm (63\")   Wt 134 kg (295 lb)   SpO2 98%   BMI 52.26 kg/m²              Physical Exam  Constitutional:       Appearance: Normal appearance. She is obese.   Neurological:      General: No focal deficit present.      Mental Status: She is alert and oriented to person, place, and time.   Psychiatric:         Mood and Affect: Mood normal.         Behavior: Behavior normal.         Thought Content: Thought content normal.         Judgment: Judgment normal.         The following data was reviewed by: LEVAR Ríos on 04/22/2025:    30-day download 3/17/2025 to 4/15/2025 I have reviewed interpret the data from the download.         Assessment and Plan     Diagnoses and all orders for " this visit:    1. RUBY (obstructive sleep apnea) (Primary)  Assessment & Plan:  Patient has a baseline AHI of 32 that increases to 66 in REM.  Download shows good compliance and control.  Patient reports that since she has started BiPAP therapy her sleep is more rested.  Mask fit and airflow are comfortable.  Patient is receiving benefit from PAP therapy.  Plan to continue current treatment.    Follow-up in 3 months or sooner for any RUBY or PAP concerns.          Report if any new/changing symptoms immediately, Sleep risks reviewed (driving, medical, sleep death, sedating agents), and Sleep hygiene discussed         Follow Up  Return in about 3 months (around 7/22/2025) for RUBY follow up.  Patient was given instructions and counseling regarding her condition or for health maintenance advice. Please see specific information pulled into the AVS if appropriate.

## 2025-04-23 NOTE — ASSESSMENT & PLAN NOTE
Patient has a baseline AHI of 32 that increases to 66 in REM.  Download shows good compliance and control.  Patient reports that since she has started BiPAP therapy her sleep is more rested.  Mask fit and airflow are comfortable.  Patient is receiving benefit from PAP therapy.  Plan to continue current treatment.    Follow-up in 3 months or sooner for any RUBY or PAP concerns.

## 2025-05-13 DIAGNOSIS — G62.9 NEUROPATHY: ICD-10-CM

## 2025-05-13 DIAGNOSIS — M54.41 CHRONIC BILATERAL LOW BACK PAIN WITH BILATERAL SCIATICA: ICD-10-CM

## 2025-05-13 DIAGNOSIS — M54.42 CHRONIC BILATERAL LOW BACK PAIN WITH BILATERAL SCIATICA: ICD-10-CM

## 2025-05-13 DIAGNOSIS — G89.29 CHRONIC BILATERAL LOW BACK PAIN WITH BILATERAL SCIATICA: ICD-10-CM

## 2025-05-14 RX ORDER — GABAPENTIN 600 MG/1
TABLET ORAL
Qty: 135 TABLET | Refills: 3 | Status: SHIPPED | OUTPATIENT
Start: 2025-05-14

## 2025-05-14 NOTE — TELEPHONE ENCOUNTER
Rx Refill Note  Requested Prescriptions     Pending Prescriptions Disp Refills    gabapentin (NEURONTIN) 600 MG tablet [Pharmacy Med Name: GABAPENTIN 600MG TABLETS] 135 tablet      Sig: TAKE 1 AND 1/2 TABLETS BY MOUTH THREE TIMES DAILY      Last filled:4/8/25-jo ann  Last office visit with prescribing clinician: 9/23/24-yue  Next office visit with prescribing clinician: 9/23/25-yue Bingham MA  05/14/25, 09:25 EDT    Pending to provider

## 2025-07-22 ENCOUNTER — OFFICE VISIT (OUTPATIENT)
Dept: CARDIOLOGY | Facility: CLINIC | Age: 50
End: 2025-07-22
Payer: MEDICAID

## 2025-07-22 VITALS
SYSTOLIC BLOOD PRESSURE: 140 MMHG | BODY MASS INDEX: 51.91 KG/M2 | DIASTOLIC BLOOD PRESSURE: 72 MMHG | OXYGEN SATURATION: 95 % | HEIGHT: 63 IN | HEART RATE: 78 BPM | WEIGHT: 293 LBS

## 2025-07-22 DIAGNOSIS — G47.33 OSA (OBSTRUCTIVE SLEEP APNEA): Primary | ICD-10-CM

## 2025-07-22 PROCEDURE — 99213 OFFICE O/P EST LOW 20 MIN: CPT | Performed by: NURSE PRACTITIONER

## 2025-07-22 PROCEDURE — 3078F DIAST BP <80 MM HG: CPT | Performed by: NURSE PRACTITIONER

## 2025-07-22 PROCEDURE — 3077F SYST BP >= 140 MM HG: CPT | Performed by: NURSE PRACTITIONER

## 2025-07-22 NOTE — PROGRESS NOTES
Follow-Up Sleep Consult     Date:   2025  Name: Tawana Christianson  :   1975  PCP: Carol Curiel APRN    Chief Complaint   Patient presents with    Sleep Apnea       Subjective     History of Present Illness  Tawana Christianson is a 49 y.o. female who presents today for follow-up on RUBY.    Patient states that she really notices if she does not use her pap device.  She does sometimes have difficulty staying asleep after 4 hours.  She states that she does have improved sleep.  She reports improved daytime sleepiness and fatigue.  She reports she is on napping during the day like she was.  She states she may nap one or two times a week which is better than every day.  Mask fit and airflow are comfortable.    RUBY history:    PSG 2022 baseline AHI 32 that increased to 66 and REM.    Titration 25 Titrated to Bipap 16/10 PS 4     SM [facility]  Air-touch F20      Current mask used is NM    Device Functioning Well: Yes  Mask Fit Comfortable: Yes  Air Flow Comfortable: Yes  DME Helpful for Supplies: Yes  Sleep is rested: Yes        Device Download:                     The patient's relevant past medical, surgical, family, and social history reviewed and updated in Epic as appropriate.    Past Medical History:   Diagnosis Date    Chills     Essential hypertension 2016    Fibromyalgia     Foot pain     History of abnormal cervical Pap smear     normal not abnormal -     History of Papanicolaou smear of cervix     Hypertension     Numbness     Pain     Paresthesia     Plantar fasciitis     Pulmonary nodule     Right ankle pain     Tingling     Vertigo     Viral infection     Viral infection     Vitamin D deficiency      Past Surgical History:   Procedure Laterality Date     SECTION      x3    TONSILLECTOMY       OB History    No obstetric history on file.       Allergies   Allergen Reactions    Diclofenac Swelling    Voltaren [Diclofenac Sodium] Swelling     Prior to Admission  medications    Medication Sig Start Date End Date Taking? Authorizing Provider   acetaminophen (TYLENOL) 325 MG tablet Take 2 tablets by mouth.   Yes ProviderEna MD   Aspirin Low Dose 81 MG chewable tablet Chew 1 tablet Daily. 2/5/25  Yes Ena Stone MD   busPIRone (BUSPAR) 10 MG tablet Take 1 tablet by mouth. 1/6/25  Yes Ena Stone MD   celecoxib (CeleBREX) 200 MG capsule Take 1 capsule by mouth Daily. 8/21/23  Yes Ena Stone MD   cetirizine (zyrTEC) 10 MG tablet Take  by mouth. 2/20/15  Yes Provider, MD Ena   cholecalciferol (VITAMIN D3) 1000 units tablet Take 1 tablet by mouth Daily.   Yes ProviderEna MD   DULoxetine (CYMBALTA) 60 MG capsule Take 1 capsule by mouth Daily. 6/18/23  Yes Ena Stone MD   FLUoxetine (PROzac) 40 MG capsule Take 1 capsule by mouth Daily. 2/25/21  Yes ProviderEna MD   gabapentin (NEURONTIN) 600 MG tablet TAKE 1 AND 1/2 TABLETS BY MOUTH THREE TIMES DAILY 5/14/25  Yes Reinaldo Zelaya MD   levothyroxine (SYNTHROID, LEVOTHROID) 200 MCG tablet Take 1 tablet by mouth Daily. 9/1/23  Yes ProviderEna MD   lisinopril (PRINIVIL,ZESTRIL) 20 MG tablet 1 tablet. 1/10/25  Yes ProviderEna MD   metFORMIN ER (GLUCOPHAGE-XR) 500 MG 24 hr tablet 1 tablet. 1/20/25  Yes ProviderEna MD   pantoprazole (PROTONIX) 20 MG EC tablet Take 1 tablet by mouth Daily.   Yes Provider, MD Ena   potassium chloride 10 MEQ CR tablet 1 tablet. 12/1/24  Yes ProviderEna MD   prazosin (MINIPRESS) 2 MG capsule Take 1 capsule by mouth Every Night.   Yes ProviderEna MD   propranolol (INDERAL) 20 MG tablet Take 1 tablet by mouth. 10/31/24  Yes ProviderEna MD   QUEtiapine (SEROquel) 50 MG tablet Take 1 tablet by mouth. 11/15/24  Yes Ena Stone MD   rosuvastatin (CRESTOR) 10 MG tablet Take 1 tablet by mouth Daily. 8/29/23  Yes ProviderEna MD  "  valsartan-hydrochlorothiazide (DIOVAN-HCT) 320-12.5 MG per tablet Take 1 tablet by mouth Daily. 3/13/25  Yes Ena Stone MD   furosemide (LASIX) 20 MG tablet 1 tablet. 11/6/24 7/22/25  Ena Stone MD   hydrOXYzine pamoate (VISTARIL) 25 MG capsule Take 1 capsule by mouth Every Night. 2/25/21 7/22/25  Ena Stone MD   lisinopril-hydrochlorothiazide (PRINZIDE,ZESTORETIC) 20-12.5 MG per tablet Take 1 tablet by mouth Daily. 2/26/18 7/22/25  Ngozi Lozada APRN   LORazepam (ATIVAN) 0.5 MG tablet TAKE 1/2 TO 1 TABLET BY MOUTH ONCE DAILY AS NEEDED FOR ANXIETY 2/25/21 7/22/25  Ena Stone MD   vitamin B-12 (CYANOCOBALAMIN) 1000 MCG tablet Take 1 tablet by mouth Daily.  7/22/25  Ena Stone MD     Family History   Problem Relation Age of Onset    Hypertension Mother     Arthritis Mother     Heart disease Father     Diabetes Father     Hypertension Father     Stroke Father     Heart attack Father     Arthritis Father     Obesity Father     Alzheimer's disease Maternal Grandmother     Cancer Maternal Grandmother     Cancer Maternal Grandfather     Alzheimer's disease Maternal Grandfather        Objective     Vital Signs:  /72   Pulse 78   Ht 160 cm (63\")   Wt (!) 138 kg (305 lb)   SpO2 95%   BMI 54.03 kg/m²              Physical Exam  Constitutional:       Appearance: Normal appearance. She is obese.   Neurological:      General: No focal deficit present.      Mental Status: She is alert and oriented to person, place, and time.   Psychiatric:         Mood and Affect: Mood normal.         Behavior: Behavior normal.         Thought Content: Thought content normal.         Judgment: Judgment normal.         The following data was reviewed by: LEVAR Ríos on 07/22/2025:    30-day download 6/21/25 to 7/20/25 I have reviewed and interpreted the data from the download.         Assessment and Plan     Diagnoses and all orders for this visit:    1. RUBY " (obstructive sleep apnea) (Primary)  Assessment & Plan:  Patient has a baseline AHI of 32 that increases to 66 in REM.  Download shows good compliance and control.  Patient reports that since she has started BiPAP therapy her sleep is more rested.  Mask fit and airflow are comfortable.  Patient is receiving benefit from PAP therapy.  Plan to continue current treatment.     Follow up in 7 months or sooner for any RUBY or pap concerns.          Report if any new/changing symptoms immediately, Sleep risks reviewed (driving, medical, sleep death, sedating agents), and Sleep hygiene discussed         Follow Up  Return in about 7 months (around 2/22/2026) for Yearly RUBY.  Patient was given instructions and counseling regarding her condition or for health maintenance advice. Please see specific information pulled into the AVS if appropriate.

## 2025-07-22 NOTE — ASSESSMENT & PLAN NOTE
Patient has a baseline AHI of 32 that increases to 66 in REM.  Download shows good compliance and control.  Patient reports that since she has started BiPAP therapy her sleep is more rested.  Mask fit and airflow are comfortable.  Patient is receiving benefit from PAP therapy.  Plan to continue current treatment.     Follow up in 7 months or sooner for any RUBY or pap concerns.